# Patient Record
Sex: MALE | NOT HISPANIC OR LATINO | Employment: OTHER | URBAN - METROPOLITAN AREA
[De-identification: names, ages, dates, MRNs, and addresses within clinical notes are randomized per-mention and may not be internally consistent; named-entity substitution may affect disease eponyms.]

---

## 2020-12-31 ENCOUNTER — HOSPITAL ENCOUNTER (OUTPATIENT)
Dept: URGENT CARE | Facility: CLINIC | Age: 35
Discharge: HOME | End: 2020-12-31

## 2021-01-03 LAB — SARS-COV-2 RNA SPEC QL NAA+PROBE: DETECTED

## 2021-04-27 ENCOUNTER — OFFICE VISIT CONVERTED (OUTPATIENT)
Dept: FAMILY MEDICINE CLINIC | Facility: CLINIC | Age: 36
End: 2021-04-27
Attending: NURSE PRACTITIONER

## 2021-04-27 ENCOUNTER — HOSPITAL ENCOUNTER (OUTPATIENT)
Dept: FAMILY MEDICINE CLINIC | Facility: CLINIC | Age: 36
Discharge: HOME | End: 2021-04-27
Attending: NURSE PRACTITIONER

## 2021-04-27 ENCOUNTER — CONVERSION ENCOUNTER (OUTPATIENT)
Dept: FAMILY MEDICINE CLINIC | Facility: CLINIC | Age: 36
End: 2021-04-27

## 2021-04-27 LAB
ALBUMIN SERPL-MCNC: 4.5 G/DL (ref 3.5–5)
ALBUMIN/GLOB SERPL: 1.8 {RATIO} (ref 1.4–2.6)
ALP SERPL-CCNC: 49 U/L (ref 53–128)
ALT SERPL-CCNC: 14 U/L (ref 10–40)
AMPHET UR QL CFM: NEGATIVE
ANION GAP SERPL CALC-SCNC: 15 MMOL/L (ref 8–19)
APPEARANCE UR: ABNORMAL
AST SERPL-CCNC: 23 U/L (ref 15–50)
BARBITURATES UR QL: NEGATIVE
BASOPHILS # BLD AUTO: 0.02 10*3/UL (ref 0–0.2)
BASOPHILS NFR BLD AUTO: 0.5 % (ref 0–3)
BENZODIAZ UR QL SCN: NEGATIVE
BILIRUB SERPL-MCNC: 0.44 MG/DL (ref 0.2–1.3)
BILIRUB UR QL STRIP: NEGATIVE
BILIRUB UR QL: NEGATIVE
BUN SERPL-MCNC: 10 MG/DL (ref 5–25)
BUN/CREAT SERPL: 11 {RATIO} (ref 6–20)
CALCIUM SERPL-MCNC: 9.5 MG/DL (ref 8.7–10.4)
CHLORIDE SERPL-SCNC: 101 MMOL/L (ref 99–111)
COLOR UR: YELLOW
COLOR UR: YELLOW
CONV ABS IMM GRAN: 0.01 10*3/UL (ref 0–0.2)
CONV AMP/METHAMP UR: NEGATIVE
CONV BACTERIA: NEGATIVE
CONV CLARITY OF URINE: CLEAR
CONV CO2: 28 MMOL/L (ref 22–32)
CONV COCAINE, UR: NEGATIVE
CONV COLLECTION SOURCE (UA): ABNORMAL
CONV HIV-1/ HIV-2: NONREACTIVE
CONV IMMATURE GRAN: 0.3 % (ref 0–1.8)
CONV PROTEIN IN URINE BY AUTOMATED TEST STRIP: NEGATIVE
CONV TOTAL PROTEIN: 7 G/DL (ref 6.3–8.2)
CONV UROBILINOGEN IN URINE BY AUTOMATED TEST STRIP: 0.2 {EHRLICHU}/DL (ref 0.1–1)
CONV UROBILINOGEN IN URINE BY AUTOMATED TEST STRIP: NORMAL
CREAT UR-MCNC: 0.91 MG/DL (ref 0.7–1.2)
DEPRECATED RDW RBC AUTO: 41.5 FL (ref 35.1–43.9)
EOSINOPHIL # BLD AUTO: 0.05 10*3/UL (ref 0–0.7)
EOSINOPHIL # BLD AUTO: 1.3 % (ref 0–7)
ERYTHROCYTE [DISTWIDTH] IN BLOOD BY AUTOMATED COUNT: 12.6 % (ref 11.6–14.4)
GFR SERPLBLD BASED ON 1.73 SQ M-ARVRAT: >60 ML/MIN/{1.73_M2}
GLOBULIN UR ELPH-MCNC: 2.5 G/DL (ref 2–3.5)
GLUCOSE SERPL-MCNC: 85 MG/DL (ref 70–99)
GLUCOSE UR QL: NEGATIVE
GLUCOSE UR QL: NEGATIVE MG/DL
HCT VFR BLD AUTO: 42.6 % (ref 42–52)
HGB BLD-MCNC: 14.2 G/DL (ref 14–18)
HGB UR QL STRIP: NEGATIVE
HGB UR QL STRIP: NEGATIVE
KETONES UR QL STRIP: NEGATIVE
KETONES UR QL STRIP: NEGATIVE MG/DL
LEUKOCYTE ESTERASE UR QL STRIP: NEGATIVE
LEUKOCYTE ESTERASE UR QL STRIP: NEGATIVE
LYMPHOCYTES # BLD AUTO: 1.42 10*3/UL (ref 1–5)
LYMPHOCYTES NFR BLD AUTO: 36.7 % (ref 20–45)
MCH RBC QN AUTO: 30 PG (ref 27–31)
MCHC RBC AUTO-ENTMCNC: 33.3 G/DL (ref 33–37)
MCV RBC AUTO: 89.9 FL (ref 80–96)
MDMA UR QL SCN: NEGATIVE
METHADONE UR QL SCN: NEGATIVE
MONOCYTES # BLD AUTO: 0.38 10*3/UL (ref 0.2–1.2)
MONOCYTES NFR BLD AUTO: 9.8 % (ref 3–10)
NEUTROPHILS # BLD AUTO: 1.99 10*3/UL (ref 2–8)
NEUTROPHILS NFR BLD AUTO: 51.4 % (ref 30–85)
NITRITE UR QL STRIP: NEGATIVE
NITRITE UR QL STRIP: NEGATIVE
NRBC CBCN: 0 % (ref 0–0.7)
OPIATES UR QL SCN: NEGATIVE
OSMOLALITY SERPL CALC.SUM OF ELEC: 288 MOSM/KG (ref 273–304)
OXYCODONE UR QL SCN: NEGATIVE
PCP UR QL: NEGATIVE
PH UR STRIP.AUTO: 8.5 [PH]
PH UR STRIP.AUTO: 8.5 [PH] (ref 5–8)
PLATELET # BLD AUTO: 207 10*3/UL (ref 130–400)
PMV BLD AUTO: 9.2 FL (ref 9.4–12.4)
POTASSIUM SERPL-SCNC: 4.1 MMOL/L (ref 3.5–5.3)
PROT UR QL: NEGATIVE MG/DL
RBC # BLD AUTO: 4.74 10*6/UL (ref 4.7–6.1)
RBC #/AREA URNS HPF: ABNORMAL /[HPF]
SODIUM SERPL-SCNC: 140 MMOL/L (ref 135–147)
SP GR UR: 1.01 (ref 1–1.03)
SP GR UR: 1.02
T4 FREE SERPL-MCNC: 1.1 NG/DL (ref 0.9–1.8)
THC SERPLBLD CFM-MCNC: NEGATIVE NG/ML
TSH SERPL-ACNC: 1.26 M[IU]/L (ref 0.27–4.2)
WBC # BLD AUTO: 3.87 10*3/UL (ref 4.8–10.8)
WBC #/AREA URNS HPF: ABNORMAL /[HPF]

## 2021-04-28 LAB
25(OH)D3 SERPL-MCNC: 39.7 NG/ML (ref 30–100)
FOLATE SERPL-MCNC: >20 NG/ML (ref 4.8–20)
HSV I/II IGM: <0.91 RATIO (ref 0–0.9)
HSV1 IGG SER IA-ACNC: 49.2 INDEX (ref 0–0.9)
HSV2 IGG SER IA-ACNC: <0.91 INDEX (ref 0–0.9)
RPR SER QL: NORMAL
VIT B12 SERPL-MCNC: 679 PG/ML (ref 211–911)

## 2021-04-29 LAB
C TRACH RRNA CVX QL NAA+PROBE: NEGATIVE
CONV HEPATITIS B SURFACE AG W CONFIRMATION RE: NEGATIVE
CONV HEPATITIS COMMENT: NORMAL
HBV CORE AB SER DONR QL IA: NEGATIVE
HBV CORE IGM SERPL QL IA: NEGATIVE
HBV E AB SERPL QL IA: NEGATIVE
HBV E AG SERPL QL IA: NEGATIVE
HBV SURFACE AB SER QL: NON REACTIVE
HCV AB S/CO SERPL IA: <0.1 S/CO RATIO (ref 0–0.9)
N GONORRHOEA DNA SPEC QL NAA+PROBE: NEGATIVE

## 2021-05-11 ENCOUNTER — HOSPITAL ENCOUNTER (OUTPATIENT)
Dept: FAMILY MEDICINE CLINIC | Facility: CLINIC | Age: 36
Discharge: HOME | End: 2021-05-11
Attending: NURSE PRACTITIONER

## 2021-05-11 ENCOUNTER — OFFICE VISIT CONVERTED (OUTPATIENT)
Dept: FAMILY MEDICINE CLINIC | Facility: CLINIC | Age: 36
End: 2021-05-11
Attending: NURSE PRACTITIONER

## 2021-05-11 LAB
IRON SATN MFR SERPL: 18 % (ref 20–55)
IRON SERPL-MCNC: 65 UG/DL (ref 70–180)
MAGNESIUM SERPL-MCNC: 1.99 MG/DL (ref 1.6–2.3)
TIBC SERPL-MCNC: 355 UG/DL (ref 245–450)
TRANSFERRIN SERPL-MCNC: 248 MG/DL (ref 215–365)

## 2021-05-11 NOTE — H&P
"   History and Physical      Patient Name: Dominik Montiel   Patient ID: [id number]   Sex: Male   YOB: 1985        Visit Date: April 27, 2021    Provider: SKYE Castillo   Location: [facility]   Location Address: [id number] [place] [address] [place]  [id number]   Location Phone: [phone]          Chief Complaint  · est. visit  · trouble sleeping  · anxiety, depression  · meds for chemical inbalance  · wants blood work      History Of Present Illness  Dominik Montiel is a 35 year old male who presents for evaluation and treatment of:      est care    anxiety/depression/insomnia- he works for [employer], he stays at work as long as possible so he doesn't have to go home and be with his family. Its a good job for him because he doesn't have to interact wiht people. He is nervous all the time, he is depressed, he doens't feel comfortable around anyone. He has tried mult meds in the past, zoloft, paxil, effexor and nothing helps him except wellbutrin. He was taking that from [facility]. He is emotional, he feels hopeless, he denies SI/HI. He scores a 25 on the PHQ9. This has gone on since he was a teenager.     the insomnia is bad, he has had it since he was a teen. Has taken restoril, tenazepam, ambien in the past wiht no improvment. He lays in bed and can't turn his mind off. \"its the anxiety.\"    weakness, fatigue    hx of STI, would like a screen today, he does have dysuria and increased freq. at times.       Past Medical History  Disease Name Date Onset Notes   Anxiety --  --    Depression --  --    Hemorrhoids --  --    Hernia --  --    Psychiatric problem --  --    Seasonal allergies --  --    Sinus trouble --  --    Skin disorder --  --    STD (male) --  --          Past Surgical History  Procedure Name Date Notes   Colonoscopy --  --          Allergy List  Allergen Name Date Reaction Notes   NO KNOWN DRUG ALLERGIES --  --  --        Allergies Reconciled  Family Medical " "History  Disease Name Relative/Age Notes   *No Known Family History  --          Social History  Finding Status Start/Stop Quantity Notes   Tobacco Never --/-- --  --          Review of Systems  · Constitutional  o Admits  o : fatigue, loss of appetite  o Denies  o : fever  · HENT  o Denies  o : headaches, lightheadedness  · Cardiovascular  o Denies  o : lower extremity edema, claudication, chest pressure, palpitations  · Respiratory  o Denies  o : shortness of breath, wheezing, cough, hemoptysis, dyspnea on exertion  · Gastrointestinal  o Admits  o : constipation  o Denies  o : nausea, vomiting, diarrhea, abdominal pain  · Psychiatric  o Admits  o : anxiety, depression  o Denies  o : suicidal ideation, homicidal ideation      Vitals  Date Time BP Position Site L\R Cuff Size HR RR TEMP (F) WT  HT  BMI kg/m2 BSA m2 O2 Sat FR L/min FiO2        04/27/2021 01:54 /66 Sitting    74 - R 16 98.1 132lbs 0oz 5'  8\" 20.07 1.69 96 %            Physical Examination  · Constitutional  o Appearance  o : well developed, well-nourished, no acute distress  · Neck  o Inspection/Palpation  o : normal appearance, no masses or tenderness, trachea midline  o Thyroid  o : gland size normal, nontender, no nodules or masses present on palpation  · Respiratory  o Respiratory Effort  o : breathing unlabored  o Inspection of Chest  o : chest rise symmetric bilaterally  o Auscultation of Lungs  o : clear to auscultation bilaterally throughout inspiration and expiration  · Cardiovascular  o Heart  o :   § Auscultation of Heart  § : regular rate and rhythm, no murmurs, gallops or rubs  o Peripheral Vascular System  o :   § Extremities  § : no edema  · Lymphatic  o Neck  o : no cervical lymphadenopathy, no supraclavicular lymphadenopathy  · Psychiatric  o Mood and Affect  o : mood and affect anxious          Results  · In-Office Procedures  o Lab procedure  § IOP - Urinalysis without Microscopy (Clinitek) [facility] (65842)   § Color Ur: Yellow "   § Clarity Ur: Clear   § Glucose Ur Ql Strip: Negative   § Bilirub Ur Ql Strip: Negative   § Ketones Ur Ql Strip: Negative   § Sp Gr Ur Qn: 1.020   § Hgb Ur Ql Strip: Negative   § pH Ur-LsCnc: 8.5   § Prot Ur Ql Strip: Negative   § Urobilinogen Ur Strip-mCnc: 0.2 E.U./dL   § Nitrite Ur Ql Strip: Negative   § WBC Est Ur Ql Strip: Negative   § IOP - Urine Drug Screen In-House [facility] (73491)   § Amphetamines Ur Ql: Negative   § Barbiturates Ur Ql: Negative   § Buprenorphine+Nor Ur Ql Scn: Negative   § Benzodiaz Ur Ql: Negative   § Cocaine Ur Ql: Negative   § Methadone Ur Ql: Negative   § Methamphet Ur Ql: Negative   § MDMA Ur Ql Scn: Negative   § Opiates Ur Ql: Negative   § Oxycodone Ur Ql: Negative   § PCP Ur Ql: Negative   § THC Ur Ql: Negative   § Temp in Range?: Within/Acceptable   § Control Seen?: Yes       Assessment  · Screening for depression     V79.0/Z13.89  · Fatigue     780.79/R53.83  · Insomnia, unspecified     780.52/G47.00  · STD exposure     V01.6/Z20.2  · Anxiety and depression       Anxiety disorder, unspecified     300.00/F41.9  Major depressive disorder, single episode, unspecified     300.00/F32.9  · Establishing care with new doctor, encounter for     V65.8/Z76.89      Plan  · Orders  o Annual depression screening, 15 minutes (93224, ) - V79.0/Z13.89 - 04/27/2021  o GC/Chlamydia by PCR (Urine or Vaginal Swab) [facility] ([facility]) - V01.6/Z20.2 - 04/27/2021  o HIV (1 and 2) Screen by EIA (41969) - V01.6/Z20.2 - 04/27/2021  o RPR (Treponemal pallidum Antibody) (95627) - V01.6/Z20.2 - 04/27/2021  o HSV 1 + 2 ab (22542, 57357) - V01.6/Z20.2 - 04/27/2021  o Hepatitis B and C screen (79103) - V01.6/Z20.2 - 04/27/2021  o CBC with Auto Diff [facility] (97824) - - 04/27/2021  o CMP [facility] (93546) - - 04/27/2021  o Thyroid Profile (40932, THYII, 40234) - - 04/27/2021  o Vitamin D (25-Hydroxy) Level (42417) - - 04/27/2021  o ACO-18: Positive screen for clinical depression using a standardized tool and a follow-up plan  documented () - - 04/27/2021  o ACO-39: Current medications updated and reviewed (, 1159F) - - 04/27/2021  o B12 Folate levels (B12FO) - - 04/27/2021  o Urinalysis with Reflex Microscopy if abnormal ([facility]) (41596) - - 04/27/2021  · Medications  o bupropion HCl 150 mg oral tablet extended release 24 hr   SIG: take 1 tablet (150 mg) by oral route once daily   DISP: (30) Tablet with 0 refills  Prescribed on 04/27/2021     o Lunesta 1 mg oral tablet   SIG: take 1-2 tablets by oral route daily   DISP: (30) Tablet with 0 refills  Adjusted on 04/27/2021     o Medications have been Reconciled  o Transition of Care or Provider Policy  · Instructions  o Depression Screen completed and scanned into the EMR under the designated folder within the patient's documents.  o Today's PHQ-9 result is _25__  o The provider screening met the required time of 15 minutes.  o Avoid any electronic use for at least 30 minutes prior to bed time. Cell phone screens, tablets and TVs imitate daylight, so your brain can become confused on the time of day. No caffeine use in the late afternoon and evenings.  o Obtained a written consent for MALIK query. Discussed the risk and benefits of the use of controlled substances with the patient, including the risk of tolerance and drug dependence. The patient has been counseled on the need to have an exit strategy, including potentially discontinuing the use of controlled substances. MALIK has or will be reviewed as soon as it becomes avaliable.  o Take all medications as prescribed/directed.  o Patient was educated/instructed on their diagnosis, treatment and medications prior to discharge from the clinic today.  o Patient was instructed to exercise regularly.  o Patient instructed to seek medical attention urgently for new or worsening symptoms.  o Call the office with any concerns or questions.  o Minutes spent with patient including greater than 50% in Education/Counseling/Care  Coordination.  o Time spent with the patient was minutes, more than 50% face to face.  o Chronic conditions reviewed and taken into consideration for today's treatment plan.  o will try hiim on lunesta with precautions  o will do wellbutrin wiht f/u in 2 weeks sooner if needed  o he says he used to go to [employer], he would like to get back into that again  o encourage exercise and outdoors  o check labs and call with results  · Disposition  o Call or Return if symptoms worsen or persist.  o F/U with me in 2 weeks            Electronically Signed by: SKYE Castillo -Author on April 27, 2021 03:25:32 PM

## 2021-05-14 VITALS
RESPIRATION RATE: 16 BRPM | HEIGHT: 68 IN | DIASTOLIC BLOOD PRESSURE: 66 MMHG | BODY MASS INDEX: 20 KG/M2 | OXYGEN SATURATION: 96 % | SYSTOLIC BLOOD PRESSURE: 114 MMHG | HEART RATE: 74 BPM | TEMPERATURE: 98.1 F | WEIGHT: 132 LBS

## 2021-05-25 ENCOUNTER — OFFICE VISIT CONVERTED (OUTPATIENT)
Dept: PSYCHIATRY | Facility: CLINIC | Age: 36
End: 2021-05-25
Attending: STUDENT IN AN ORGANIZED HEALTH CARE EDUCATION/TRAINING PROGRAM

## 2021-06-05 NOTE — PROGRESS NOTES
"   Progress Note      Patient Name: Dominik Montiel   Patient ID: [id number]   Sex: Male   YOB: 1985    Referring Provider: Efrain Carnes MD    Visit Date: May 11, 2021    Provider: SKYE Castillo   Location: [facility]   Location Address: [address]  [place]  [id number]   Location Phone: [phone]          Chief Complaint  · insomnia  · anxiety      History Of Present Illness  Dominik Montiel is a 35 year old /White male who presents for evaluation and treatment of:      f.u on anxiety, insomnia    the wellbutrin has made him more \"agitated.\" He says he just needs more time on it, it's done this before. He is not sleeping, the lunesta doesn't work, he is tired of doctors just throwing more and more pills at him, he wants more labs checked, he thinks magnesium needs to be checked, he thinks he may need a brain scan to check his hormones. Everything makes him on edge, fluorescent lights \"mess me up.\"    He has tried and failed for sleep ambien, melatonin, trazodone, temazepam, and lunesta. he wants a sleep evaluation.     for the anxiety/depression, he has been on paxil, Zoloft, Effexor, Lexapro, Prozac in the past. He says \"they just messed me up and nothing works.\"     He does not want to see psych.       Past Medical History  Disease Name Date Onset Notes   ADHD (attention deficit hyperactivity disorder) 03/10/2015 --    Allergic rhinitis --  --    Anxiety --  --    Bipolar disorder --  --    Deafness --  --    Depression --  --    Hemorrhoids --  --    Insomnia 02/09/2015 --    Memory loss/Forgetfulness --  --    Night sweats --  --    Sinus Trouble; unspecified --  --          Past Surgical History  Procedure Name Date Notes   [place] Tooth Extraction --  --          Medication List  Name Date Started Instructions   bupropion HCl 150 mg oral tablet extended release 24 hr  take 1 tablet (150 mg) by oral route once daily         Allergy " "List  Allergen Name Date Reaction Notes   NO KNOWN DRUG ALLERGIES --  --  --        Allergies Reconciled  Family Medical History  Disease Name Relative/Age Notes   - No Family History of Colorectal Cancer  --    *Non Contributory  --          Social History  Finding Status Start/Stop Quantity Notes   Alcohol Former --/-- --  --    Moderate Amount of Exercise (1-3 times weekly) --  --/-- --  --    Tobacco Former --/-- --  --          Review of Systems  · Constitutional  o Admits  o : insomnia  o Denies  o : fever, fatigue, weight loss, weight gain  · Cardiovascular  o Denies  o : lower extremity edema, claudication, chest pressure, palpitations  · Respiratory  o Denies  o : shortness of breath, wheezing, cough, hemoptysis, dyspnea on exertion  · Gastrointestinal  o Denies  o : nausea, vomiting, diarrhea, constipation, abdominal pain  · Neurologic  o Admits  o : difficulty concentrating  · Psychiatric  o Admits  o : anxiety, depression      Vitals  Date Time BP Position Site L\R Cuff Size HR RR TEMP (F) WT  HT  BMI kg/m2 BSA m2 O2 Sat FR L/min FiO2        05/11/2021 03:22 /64 Sitting    82 - R 16 98.2 130lbs 0oz 5'  8\" 19.77 1.68 100 %            Physical Examination  · Constitutional  o Appearance  o : well developed, well-nourished, no acute distress  · Respiratory  o Respiratory Effort  o : breathing unlabored  o Inspection of Chest  o : chest rise symmetric bilaterally  o Auscultation of Lungs  o : clear to auscultation bilaterally throughout inspiration and expiration  · Cardiovascular  o Heart  o :   § Auscultation of Heart  § : regular rate and rhythm, no murmurs, gallops or rubs  o Peripheral Vascular System  o :   § Extremities  § : no edema  · Lymphatic  o Neck  o : no cervical lymphadenopathy, no supraclavicular lymphadenopathy  · Psychiatric  o Judgement and Insight  o : judgement and insight intact  o Thought Processes  o : rapidity of thinking present   o Mood and Affect  o : mood agitated, " "affect anxious  o Presence of Abnormal Thoughts  o : no homicidal ideation, no suicidal ideation, mild obsessional thought processes present              Assessment  · Anxiety disorder     300.00/F41.9  · Fatigue     780.79/R53.83  · Insomnia, unspecified     780.52/G47.00  · Major depress dis, severe     296.23/F32.2  · Agitation     307.9/R45.1      Plan  · Orders  o ACO-39: Current medications updated and reviewed (1159F, ) - - 05/11/2021  o Psychiatric Consultation (PSYCH) - - 05/11/2021   prefer dr Watt only, I consulted with him on this pt.today  o Magnesium, serum (82776) - - 05/11/2021  o Iron Profile (Iron 11370 TIBC 29292 and Transferrin 14299) (IRONP) - - 05/11/2021  o Sleep Disorder Clinic Consultation (SLEEP) - - 05/11/2021  [facility] - - 05/11/2021  · Medications  o Abilify 2 mg oral tablet   SIG: take 1 tablet (2 mg) by oral route once daily   DISP: (30) Tablet with 0 refills  Prescribed on 05/11/2021     o Lunesta 1 mg oral tablet   SIG: take 1-2 tablet by oral route QD   DISP: (0) Tablet with 0 refills  Discontinued on 05/11/2021     o Medications have been Reconciled  o Transition of Care or Provider Policy  · Instructions  o Discussed the need for therapy, either with a certified counselor, psychologist, and/or family . If no improvement is noted or worsening of their condition, return to office or ER. But also discussed with patient that if they are non-responsive to the type of medication they may need to see a psychiatrist for further evaluation and management.  o Patient was given an SSRI/SSNRI medication and warned of possible side effects of the medication including potential for increased risk of suicidal thoughts and feelings. Patient was instructed that if they begin to exhibit any of these effects they will discontinue the medication immediately and contact our office or the ER ASAP.  o Patient agrees to a \"No Self Harm\" contract. Patient will " either call us, 911, ER, [facility], [facility].  o Avoid any electronic use for at least 30 minutes prior to bed time. Cell phone screens, tablets and TVs imitate daylight, so your brain can become confused on the time of day. No caffeine use in the late afternoon and evenings.  o Patient was educated/instructed on their diagnosis, treatment and medications prior to discharge from the clinic today.  o Patient instructed to seek medical attention urgently for new or worsening symptoms.  o Call the office with any concerns or questions.  o Minutes spent with patient including greater than 50% in Education/Counseling/Care Coordination.  o Time spent with the patient was minutes, more than 50% face to face.  o I consulted with Dr Watt on this pt, because I feel like the wellbutrin has made him on the manic side, pt is willing to take abilify low dose with the wellbutrin, he does not want to come off the wellbutrin. he will call me in a week to touch base and let me know how he is doing, will do genesight testing and call wht results as well as other labs. He is willing to see Psych and we will also do the sleep center consultation. He does nt want to try anything else for sleep at this time.   · Disposition  o Call or Return if symptoms worsen or persist.            Electronically Signed by: SKYE Castillo -Author on May 14, 2021 05:19:41 PM

## 2021-06-05 NOTE — H&P
"   History and Physical      Patient Name: Dominik Montiel   Patient ID: [id number]   Sex: Male   YOB: 1985    Referring Provider: Sandra CHEUNG    Visit Date: May 25, 2021    Provider: Natalia Watt MD   Location: [facility]   Location Address: [address] [place] [address]  [address] [facility]  [place]  [id number]   Location Phone: [phone]          Chief Complaint     \"I've been anxious for ten years.\"       History Of Present Illness  Dominik Montiel is a 35 year old /White male who presents to the office today referred by Sandra CHEUNG.   Chart review 5/25: Patient seen May 11 for follow-up for anxiety and insomnia. He tried Wellbutrin but it made him more agitated. He has failed Ambien, melatonin, trazodone, temazepam, Lunesta. He would like a sleep evaluation. He has been trialed on Paxil, Zoloft, Effexor, Lexapro, Prozac in the past and nothing works. Started on Abilify. MRI in 2014 shows a 4 millimeter high flow enhancing focus in the right aspect of the pituitary gland consistent with a microadenoma, no acute. Magnesium is normal, iron is low at 65% sat is low at 18% CMP is unremarkable, vitamin D is normal, B12 and folate are normal, UA is unremarkable, HIV is negative, GC chlamydia is negative, hepatitis panel is negative, positive for herpes type I IgG, negative for syphilis, CBC is unremarkable, TSH and free T4 are unremarkable.   Patient is distractible, flight of ideas is present, sleeping about 2 hours a night, extremely talkative and difficult to interrupt. Notes anxiety for the last 10 years. Also notes social anxiety. States that he \"cannot think\" because his mind is racing all the time.   Very difficult to obtain information from the patient.   No SI HI AVH. No access to weapons. Psychiatric review of systems is positive for urmila, anxiety, depression, negative for psychosis.   ...   Past Psychiatric History:  Began Psychiatric Treatment: 9 yrs of " age for adhd   Dx: adhd, a, d, insomnia   Psychiatrist: Young last year, cannot remember name   Therapist: young, one visit, didn't like   : denies   Admissions History: denies   Medication Trials: Multiple. See hpi. Adderall, concerta didn't help. wellbutrin used to work but it is making him more anxious   Self-Harm: denies   Suicide Attempts: denies   Substance Abuse History:  Types: tried Xanax and Seroquel once last year to sleep. Denies all else, including illicit.   Withdrawl Symptoms: na   Longest period sober: na   AA: N/A   Admissions History: na   Residential History: na   Legal: N/A   Social History:  Marital Status: Single   Employed: Yes   Employer: [employer]   Kids: denies   House: lives with his father    Hx: denies   Family History:  Suicide Attempts: deneis   Suicide Completions: deneis   Substance Use: denies   Psychiatric Conditions: mom: d, maternal cousin: adhd    depression, psychosis, anxiety: na   Developmental History:  Born: [place]   Siblings: brother   Childhood: no abuse   High School: completed   College: completed, business mx degree       Past Medical History  Disease Name Date Onset Notes   ADHD (attention deficit hyperactivity disorder) 03/10/2015 --    Allergic rhinitis --  --    Anxiety --  --    Bipolar disorder --  --    Deafness --  --    Depression --  --    Hemorrhoids --  --    Insomnia 2015 --    Memory loss/Forgetfulness --  --    Night sweats --  --    Sinus Trouble; unspecified --  --          Past Surgical History  Procedure Name Date Notes   [place]a Tooth Extraction --  --          Medication List  Name Date Started Instructions   bupropion HCl 150 mg oral tablet extended release 24 hr  take 1 tablet (150 mg) by oral route once daily         Allergy List  Allergen Name Date Reaction Notes   NO KNOWN DRUG ALLERGIES --  --  --          Family Medical History  Disease Name Relative/Age Notes   - No Family History of Colorectal Cancer  --   "  Family history of depression  --          Social History  Finding Status Start/Stop Quantity Notes   Alcohol Former --/-- --  --    Moderate Amount of Exercise (1-3 times weekly) --  --/-- --  --    Tobacco Former --/-- --  --          Review of Systems  · Constitutional  o Admits  o : fatigue  o Denies  o : night sweats  · Eyes  o Admits  o : double vision  o Denies  o : blurred vision  · HENT  o Denies  o : vertigo, recent head injury  · Cardiovascular  o Denies  o : chest pain, irregular heart beats  · Respiratory  o Denies  o : shortness of breath, productive cough  · Gastrointestinal  o Denies  o : nausea, vomiting  · Genitourinary  o Denies  o : dysuria, urinary retention  · Integument  o Denies  o : hair growth change, new skin lesions  · Neurologic  o Denies  o : altered mental status, seizures  · Musculoskeletal  o Denies  o : joint swelling, limitation of motion  · Endocrine  o Denies  o : cold intolerance, heat intolerance  · Psychiatric  o Admits  o : anxiety, depression, obsessive compulsive disorder  o Denies  o : post traumatic stress disorder, psychosis, urmila  · Allergic-Immunologic  o Denies  o : frequent illnesses      Physical Examination  · Mental Status Exam  o Appearance  o : good eye contact, normal street clothes, groomed, mask, beard  o Behavior  o : pleasant and cooperative  o Motor  o : some fidgeting  o Speech  o : hard to interrupt. talkative. Rapid rhythm, rate, volume, tone; hyperverbal, not pressured, normal prosidy  o Mood  o : \"up\"  o Affect  o : irritable, hypomanic  o Thought Content  o : negative suicidal ideations, negative homicidal ideations, negative obsessions  o Perceptions  o : negative auditory hallucinations, negative visual hallucinations  o Thought Process  o : flight of ideas  o Insight/Judgement  o : fair/fair  o Cognition  o : grossly intact  o Attention  o : distractible          Assessment  · Screening for depression     V79.0/Z13.31  · Bipolar 1 disorder with " moderate urmila     296.42/F31.12       Presentation most consistent with bipolar 1 disorder, currently hypomanic.  Anxiety, depression, ADHD by history.    Start low-dose Seroquel in the evenings as the patient is kasia to it.  Therapy is deferred.  Discontinue bupropion.    See back in 1 month.       Plan  · Orders  o ACO-18: Positive screen for clinical depression using a standardized tool and a follow-up plan documented () - V79.0/Z13.31 - 05/25/2021  o ACO-39: Current medications updated and reviewed (, 1159F) - - 05/25/2021  · Medications  o quetiapine 50 mg oral tablet   SIG: take 1 tablet by oral route once a day (at bedtime) for 30 days   DISP: (30) Tablet with 3 refills  Prescribed on 05/25/2021     o bupropion HCl 150 mg oral tablet extended release 24 hr   SIG: take 1 tablet (150 mg) by oral route once daily   DISP: (0) Tablet with 0 refills  Discontinued on 05/25/2021     o Medications have been Reconciled  o Transition of Care or Provider Policy  · Instructions  o Risk Assessment: Risk of self-harm acutely is moderate. Risk factors include anxiety disorder, depressive disorder, hypomania, recent psychosocial stressors (pandemic). Protective factors include no family history, no present SI, no history of suicide attempts or self-harm in the past, no access to weapons, minimal AODA, healthcare seeking, future orientation, willingness to engage in care. Risk of self-harm chronically is also moderate, and could be further elevated in the event of treatment noncompliance and/or AODA.  o Safety: no acute safety concerns.  o Medications: START quetiapine 50 mg PO QHS. Risks, benefits, alternatives discussed with patient including nausea and vomiting, GI upset, sedation, akathisia, hypotension, increased appetite, lowering of seizure threshold, theoretical risk of tardive dyskinesia, extrapyramidal symptoms, restless legs syndrome. After discussion of these risks and benefits, the patient voiced  understanding and agreed to proceed. DC bupropion.  o Therapy: def  o Follow Up: 1 mo  · Disposition  o Follow Up in 1 month.  · Correspondence  o Behavioral Health Letter to Referring MD (Sandra CHEUNG) - 05/25/2021            Electronically Signed by: Natalia Watt MD -Author on May 25, 2021 02:35:37 PM

## 2021-07-15 VITALS
SYSTOLIC BLOOD PRESSURE: 108 MMHG | HEIGHT: 68 IN | TEMPERATURE: 98.2 F | RESPIRATION RATE: 16 BRPM | DIASTOLIC BLOOD PRESSURE: 64 MMHG | OXYGEN SATURATION: 100 % | HEART RATE: 82 BPM | WEIGHT: 130 LBS | BODY MASS INDEX: 19.7 KG/M2

## 2021-07-15 VITALS
HEART RATE: 74 BPM | WEIGHT: 130 LBS | SYSTOLIC BLOOD PRESSURE: 112 MMHG | TEMPERATURE: 98.9 F | OXYGEN SATURATION: 96 % | BODY MASS INDEX: 20.4 KG/M2 | DIASTOLIC BLOOD PRESSURE: 67 MMHG | RESPIRATION RATE: 18 BRPM | HEIGHT: 67 IN

## 2021-10-04 ENCOUNTER — TELEPHONE (OUTPATIENT)
Dept: FAMILY MEDICINE CLINIC | Facility: CLINIC | Age: 36
End: 2021-10-04

## 2021-10-04 DIAGNOSIS — R53.83 FATIGUE, UNSPECIFIED TYPE: ICD-10-CM

## 2021-10-04 DIAGNOSIS — F51.01 PRIMARY INSOMNIA: Primary | ICD-10-CM

## 2021-10-04 NOTE — TELEPHONE ENCOUNTER
Caller: Dominik Montiel    Relationship: Self    Best call back number: [phone]    What was the call regarding: PATIENT IS CALLING IN BECAUSE HE HAD TO MISS HIS APPOINTMENT DUE TO THE FACT THAT HE MOVED.     HE IS CALLING BACK IN TO GET ANOTHER SLEEP STUDY DONE ASAP. HIS INSOMNIA IS MAKING HIM MISERABLE.     Do you require a callback: YES, ASAP, TO RESCHEDULE APPOINTMENT

## 2021-10-05 NOTE — TELEPHONE ENCOUNTER
Sandra ordered this on 5/13/21 in [place] and it was scheduled for 8/18/21, so a new order would have to be put in [facility] to get scheduled.

## 2021-10-18 ENCOUNTER — OFFICE VISIT (OUTPATIENT)
Dept: FAMILY MEDICINE CLINIC | Facility: CLINIC | Age: 36
End: 2021-10-18

## 2021-10-18 VITALS
BODY MASS INDEX: 20.76 KG/M2 | OXYGEN SATURATION: 99 % | WEIGHT: 132.3 LBS | RESPIRATION RATE: 20 BRPM | DIASTOLIC BLOOD PRESSURE: 69 MMHG | SYSTOLIC BLOOD PRESSURE: 110 MMHG | HEART RATE: 71 BPM | TEMPERATURE: 97.7 F | HEIGHT: 67 IN

## 2021-10-18 DIAGNOSIS — F90.9 HYPERACTIVITY: ICD-10-CM

## 2021-10-18 DIAGNOSIS — E61.1 IRON DEFICIENCY: ICD-10-CM

## 2021-10-18 DIAGNOSIS — Z13.220 SCREENING FOR CHOLESTEROL LEVEL: ICD-10-CM

## 2021-10-18 DIAGNOSIS — G47.00 INSOMNIA, UNSPECIFIED TYPE: ICD-10-CM

## 2021-10-18 DIAGNOSIS — F31.12 BIPOLAR AFFECTIVE DISORDER, CURRENTLY MANIC, MODERATE (HCC): Primary | ICD-10-CM

## 2021-10-18 DIAGNOSIS — F43.9 STRESS AT HOME: ICD-10-CM

## 2021-10-18 DIAGNOSIS — F90.0 ATTENTION DEFICIT HYPERACTIVITY DISORDER (ADHD), PREDOMINANTLY INATTENTIVE TYPE: ICD-10-CM

## 2021-10-18 PROBLEM — F31.9 BIPOLAR DISORDER: Status: ACTIVE | Noted: 2021-10-18

## 2021-10-18 LAB
BASOPHILS # BLD AUTO: 0.02 10*3/MM3 (ref 0–0.2)
BASOPHILS NFR BLD AUTO: 0.4 % (ref 0–1.5)
DEPRECATED RDW RBC AUTO: 41.6 FL (ref 37–54)
EOSINOPHIL # BLD AUTO: 0.06 10*3/MM3 (ref 0–0.4)
EOSINOPHIL NFR BLD AUTO: 1.1 % (ref 0.3–6.2)
ERYTHROCYTE [DISTWIDTH] IN BLOOD BY AUTOMATED COUNT: 12.5 % (ref 12.3–15.4)
FOLATE SERPL-MCNC: >20 NG/ML (ref 4.78–24.2)
HCT VFR BLD AUTO: 42.1 % (ref 37.5–51)
HGB BLD-MCNC: 14.1 G/DL (ref 13–17.7)
IMM GRANULOCYTES # BLD AUTO: 0.01 10*3/MM3 (ref 0–0.05)
IMM GRANULOCYTES NFR BLD AUTO: 0.2 % (ref 0–0.5)
LYMPHOCYTES # BLD AUTO: 2.28 10*3/MM3 (ref 0.7–3.1)
LYMPHOCYTES NFR BLD AUTO: 43.4 % (ref 19.6–45.3)
MCH RBC QN AUTO: 30 PG (ref 26.6–33)
MCHC RBC AUTO-ENTMCNC: 33.5 G/DL (ref 31.5–35.7)
MCV RBC AUTO: 89.6 FL (ref 79–97)
MONOCYTES # BLD AUTO: 0.43 10*3/MM3 (ref 0.1–0.9)
MONOCYTES NFR BLD AUTO: 8.2 % (ref 5–12)
NEUTROPHILS NFR BLD AUTO: 2.45 10*3/MM3 (ref 1.7–7)
NEUTROPHILS NFR BLD AUTO: 46.7 % (ref 42.7–76)
NRBC BLD AUTO-RTO: 0 /100 WBC (ref 0–0.2)
PLATELET # BLD AUTO: 181 10*3/MM3 (ref 140–450)
PMV BLD AUTO: 9.1 FL (ref 6–12)
RBC # BLD AUTO: 4.7 10*6/MM3 (ref 4.14–5.8)
VIT B12 BLD-MCNC: 643 PG/ML (ref 211–946)
WBC # BLD AUTO: 5.25 10*3/MM3 (ref 3.4–10.8)

## 2021-10-18 PROCEDURE — 84443 ASSAY THYROID STIM HORMONE: CPT | Performed by: NURSE PRACTITIONER

## 2021-10-18 PROCEDURE — 84466 ASSAY OF TRANSFERRIN: CPT | Performed by: NURSE PRACTITIONER

## 2021-10-18 PROCEDURE — 83540 ASSAY OF IRON: CPT | Performed by: NURSE PRACTITIONER

## 2021-10-18 PROCEDURE — 99213 OFFICE O/P EST LOW 20 MIN: CPT | Performed by: NURSE PRACTITIONER

## 2021-10-18 PROCEDURE — 82746 ASSAY OF FOLIC ACID SERUM: CPT | Performed by: NURSE PRACTITIONER

## 2021-10-18 PROCEDURE — 82306 VITAMIN D 25 HYDROXY: CPT | Performed by: NURSE PRACTITIONER

## 2021-10-18 PROCEDURE — 80061 LIPID PANEL: CPT | Performed by: NURSE PRACTITIONER

## 2021-10-18 PROCEDURE — 82607 VITAMIN B-12: CPT | Performed by: NURSE PRACTITIONER

## 2021-10-18 PROCEDURE — 85025 COMPLETE CBC W/AUTO DIFF WBC: CPT | Performed by: NURSE PRACTITIONER

## 2021-10-18 PROCEDURE — 80053 COMPREHEN METABOLIC PANEL: CPT | Performed by: NURSE PRACTITIONER

## 2021-10-18 NOTE — PROGRESS NOTES
"Chief Complaint  Anxiety, Insomnia, and Depression    Subjective      History of Present Illness  Dominik Montiel presents to [facility]     Insomnia, he has an appt for a sleep consult. Has been going on for 10 years. He cant tell me how long he sleeps at night but says he has a disorder.      \"I feel like things are going in the right direction.\"    Anxiety and depression, worse since he moved home with his dad and step mom. He doesn't see his mom. He says he just doesn't want to be around anyone. He has an appt to see a different psychiatrist this week, Dr Viera. Would like a copy of [facility] results. He did not take the seroquel or the latuda from psych, he says he \"didn't like having a diagnosis just slapped on me.\"    He wants to go over his labs from last visit and repeat them today.  He wants to know if there is a test to check for appetite.      No past medical history on file.    Allergies  Patient has no known allergies.    No past surgical history on file.    Social History     Tobacco Use   • Smoking status: Never Smoker   • Smokeless tobacco: Never Used   Substance Use Topics   • Alcohol use: Never   • Drug use: Not on file       History reviewed. No pertinent family history.     Health Maintenance Due   Topic Date Due   • ANNUAL PHYSICAL  Never done   • COVID-19 Vaccine (1) Never done   • TDAP/TD VACCINES (2 - Tdap) 05/13/2013   • INFLUENZA VACCINE  Never done        No current outpatient medications on file.      There is no immunization history on file for this patient.    Objective     Vitals:    10/18/21 1546   BP: 110/69   Pulse: 71   Resp: 20   Temp: 97.7 °F (36.5 °C)   SpO2: 99%     Body mass index is 20.72 kg/m².     Review of Systems   Constitutional: Negative.    Respiratory: Negative.    Cardiovascular: Negative.    Psychiatric/Behavioral: Positive for sleep disturbance, positive for hyperactivity, depressed mood and stress. Negative for self-injury and " suicidal ideas. The patient is nervous/anxious.        Physical Exam  Vitals reviewed.   Constitutional:       Appearance: Normal appearance. He is well-developed.   HENT:      Mouth/Throat:      Pharynx: No oropharyngeal exudate.   Cardiovascular:      Rate and Rhythm: Normal rate and regular rhythm.      Heart sounds: Normal heart sounds. No murmur heard.      Pulmonary:      Effort: Pulmonary effort is normal.      Breath sounds: Normal breath sounds.   Neurological:      Mental Status: He is alert and oriented to person, place, and time.      Cranial Nerves: No cranial nerve deficit.      Motor: No weakness.   Psychiatric:         Mood and Affect: Mood is anxious.         Speech: Speech is rapid and pressured.         Thought Content: Thought content does not include homicidal or suicidal ideation. Thought content does not include suicidal plan.           Result Review :    The following data was reviewed by: SKYE Castillo on 10/18/2021:       Depression: At risk   • PHQ-2 Score: 24       Common labs    Common Labsle 4/27/21 4/27/21    1353 1353   Glucose  85   BUN  10   Creatinine  0.91   Sodium  140   Potassium  4.1   Chloride  101   Calcium  9.5   Albumin  4.5   Total Bilirubin  0.44   Alkaline Phosphatase  49 (A)   AST (SGOT)  23   ALT (SGPT)  14   WBC 3.87 (A)    Hemoglobin 14.2    Hematocrit 42.6    Platelets 207    (A) Abnormal value                            Assessment and Plan     Diagnoses and all orders for this visit:    1. Bipolar affective disorder, currently manic, moderate (HCC) (Primary)  Comments:  Follow-up with Dr. Viera for psychiatry, will give a copy of his [facility] report at checkout  Orders:  -     Vitamin D 25 Hydroxy    2. Stress at home    3. Insomnia, unspecified type  Comments:  Follow-up at sleep study clinic for evaluation  Orders:  -     TSH    4. Attention deficit hyperactivity disorder (ADHD), predominantly inattentive type  Comments:  Discussed with  psychiatry  Orders:  -     Comprehensive Metabolic Panel  -     Vitamin D 25 Hydroxy    5. Iron deficiency  Comments:  He is not currently taking his iron we will recheck labs today  Orders:  -     CBC & Differential  -     Iron Profile    6. Hyperactivity  -     Vitamin B12 & Folate    7. Screening for cholesterol level  -     Lipid Panel            Follow Up     Return in about 3 months (around 1/18/2022).    Patient was given instructions and counseling regarding his condition or for health maintenance advice. Please see specific information pulled into the AVS if appropriate.     Dominik Montiel  reports that he has never smoked. He has never used smokeless tobacco.           Sandra Bragg, SKYE

## 2021-10-18 NOTE — PATIENT INSTRUCTIONS
Bipolar 1 Disorder  Bipolar 1 disorder is a mental health disorder in which a person has episodes of emotional highs, or urmila, and may also have episodes of lows, or depression. Bipolar 1 disorder is different from other bipolar disorders in that it involves extreme episodes of urmila (manic episodes). These episodes last at least one week or involve symptoms that are so severe that hospitalization is needed to keep the person safe.  What are the causes?  The cause of this condition is not known.  What increases the risk?  The following factors may make you more likely to develop this condition:  · Having a family member with the disorder.  · Having an imbalance of certain chemicals in the brain (neurotransmitters).  · Experiencing stress, such as illness, financial problems, or a death.  · Having certain conditions that affect the brain or spinal cord (neurologic conditions).  · Having had a brain injury (trauma).  What are the signs or symptoms?  Symptoms of urmila include:  · Very high self-esteem or self-confidence.  · Decreased need for sleep.  · Unusual talkativeness. Speech may be very fast.  · Racing thoughts with quick shifts between topics that may or may not be related (flight of ideas).  · Ability to concentrate either greatly improved or decreased.  · Increased purposeful activity, such as work, studies, or social activity.  · Increased agitation. This could be pacing, squirming, fidgeting, or finger and toe tapping.  · Impulsive behavior and poor judgment. This may result in high-risk activities that are sexual, financial, or physical.  Symptoms of depression include:  · Extreme degrees of sadness, uncontrollable crying, hopelessness, worthlessness, or numbness.  · Sleep problems, such as insomnia, waking early, or sleeping too much.  · No longer enjoying things you used to enjoy.  · Isolation. You may often spend time alone.  · Lack of energy or motivation, and moving more slowly than  normal.  · Trouble making decisions.  · Increased appetite or loss of appetite.  · Thoughts of death, or wanting to harm yourself.  Sometimes, you may have a mixed mood. This means having symptoms of urmila and depression at the same time. Stress can often trigger these symptoms.  How is this diagnosed?  This condition may be diagnosed based on:  · Emotional episodes.  · Medical history.  · Use of alcohol, drugs, and prescription medicines. Certain medical conditions and substances can cause symptoms that seem like bipolar disorder. This is called secondary bipolar disorder.  Your health care provider may ask you to take a short test. This helps to understand your symptoms. You may also be asked to see a mental health specialist to follow up on this diagnosis and start treatment.  How is this treated?         This condition is a long-term (chronic) illness. It is often managed with ongoing treatment rather than treatment only when symptoms occur. A combination of treatments is the main approach. Treatment may include:  · Medicine. Medicine can be prescribed by a health care provider who specializes in treating mental health disorders (psychiatrist). Medicines called mood stabilizers are usually prescribed. If symptoms occur during treatment with a mood stabilizer, other medicines may be added.  · Psychotherapy. Some forms of talk therapy, such as cognitive behavioral therapy (CBT) and family therapy, can help with learning to manage bipolar disorder.  · Psychoeducation. This helps you and others understand how this disorder is managed. Include friends and family in educational sessions so they learn how best to support you.  · Methods of managing your condition, such as journaling or relaxation exercises. Relaxation exercises include:  ? Yoga.  ? Meditation.  ? Deep breathing.  · Lifestyle changes, such as:  ? Limiting alcohol and drug use.  ? Exercising regularly.  ? Structuring when you go to bed and get  up.  ? Eating a healthy diet.  · Electroconvulsive therapy (ECT). This is a procedure in which electricity is applied to the brain through the scalp. It may be used in cases of severe bipolar disorder when medicine and psychotherapy work too slowly or do not work.  Follow these instructions at home:  Activity  · Return to your normal activities as told by your health care provider.  · Find activities that you enjoy, and make time to do them.  · Exercise regularly as told by your health care provider.  Lifestyle    · Follow a set schedule for eating and sleeping.  · Eat a healthy diet that includes fresh fruits and vegetables, whole grains, low-fat dairy, and lean meat.  · Get at least 7-8 hours of sleep each night.  · Avoid using products that contain nicotine or tobacco. If you want help quitting, ask your health care provider.  · Do not use drugs.    Alcohol use  · Do not drink alcohol if:  ? Your health care provider tells you not to drink.  ? You are pregnant, may be pregnant, or are planning to become pregnant.  · If you drink alcohol:  ? Limit how much you use to:  § 0-1 drink a day for women.  § 0-2 drinks a day for men.  ? Be aware of how much alcohol is in your drink. In the [place] one drink equals one 12 oz bottle of beer (355 mL), one 5 oz glass of wine (148 mL), or one 1½ oz glass of hard liquor (44 mL).  General instructions  · Take over-the-counter and prescription medicines only as told by your health care provider. You may think about stopping your medicine, but it is very important to take all your medicine as prescribed.  · Consider joining a support group. Your health care provider may be able to recommend one.  · Talk with your family and friends about your treatment goals and how they can help.  · Keep all follow-up visits as told by your health care provider. This is important.  Where to find more information  · National [place] on Mental Illness: www.thi.org  · National [place] of Mental  Health: www.[facility].gov  Contact a health care provider if:  · Your symptoms get worse, or your loved ones tell you that your symptoms are getting worse.  · You have uncomfortable side effects from your medicine.  · You have trouble sleeping.  · You have trouble doing daily activities.  · You feel unsafe in your surroundings.  · You are self-medicating with alcohol or drugs.  Get help right away if:  · You have new symptoms.  · You have thoughts about harming yourself or others.  · You are considering suicide.  If you ever feel like you may hurt yourself or others, or have thoughts about taking your own life, get help right away. You can go to your nearest emergency department or call:  · Your local emergency services (911 in the [place]  · A suicide crisis helpline, such as the National Suicide Prevention Lifeline at 1-932.276.9329. This is open 24 hours a day.  Summary  · Bipolar 1 disorder is a lifelong mental health disorder in which a person has episodes of urmila and depression.  · This disorder is mainly treated with a combination of medicines, talk and behavioral therapies, and, often, electroconvulsive therapy (ECT).  · Include friends and family in educational sessions so they know how best to support you.  · Get help right away if you are considering suicide.  This information is not intended to replace advice given to you by your health care provider. Make sure you discuss any questions you have with your health care provider.  Document Revised: 06/02/2020 Document Reviewed: 06/02/2020  [employer] Patient Education © 2021 [employer]      Major Depressive Disorder, Adult  Major depressive disorder (MDD) is a mental health condition. It may also be called clinical depression or unipolar depression. MDD causes symptoms of sadness, hopelessness, and loss of interest in things. These symptoms last most of the day, almost every day, for 2 weeks. MDD can also cause physical symptoms. It can interfere with  relationships and with everyday activities, such as work, school, and activities that are usually pleasant.  MDD may be mild, moderate, or severe. It may be single-episode MDD, which happens once, or recurrent MDD, which may occur multiple times.  What are the causes?  The exact cause of this condition is not known. MDD is most likely caused by a combination of things, which may include:  · Your personality traits.  · Brutus or conditioned behaviors or thoughts or feelings that reinforce negativity.  · Any alcohol or substance misuse.  · Long-term (chronic) physical or mental health illness.  · Going through a traumatic experience or major life changes.  What increases the risk?  The following factors may make someone more likely to develop MDD:  · A family history of depression.  · Being a woman.  · Troubled family relationships.  · Abnormally low levels of certain brain chemicals.  · Traumatic or painful events in childhood, especially abuse or loss of a parent.  · A lot of stress from life experiences, such as poor living conditions or discrimination.  · Chronic physical illness or other mental health disorders.  What are the signs or symptoms?  The main symptoms of MDD usually include:  · Constant depressed or irritable mood.  · A loss of interest in things and activities.  Other symptoms include:  · Sleeping or eating too much or too little.  · Unexplained weight gain or weight loss.  · Tiredness or low energy.  · Being agitated, restless, or weak.  · Feeling hopeless, worthless, or guilty.  · Trouble thinking clearly or making decisions.  · Thoughts of suicide or thoughts of harming others.  · Isolating oneself or avoiding other people or activities.  · Trouble completing tasks, work, or any normal obligations.  Severe symptoms of this condition may include:  · Psychotic depression.This may include false beliefs, or delusions. It may also include seeing, hearing, tasting, smelling, or feeling things that are  not real (hallucinations).  · Chronic depression or persistent depressive disorder. This is low-level depression that lasts for at least 2 years.  · Melancholic depression, or feeling extremely sad and hopeless.  · Catatonic depression, which includes trouble speaking and trouble moving.  How is this diagnosed?  This condition may be diagnosed based on:  · Your symptoms.  · Your medical and mental health history. You may be asked questions about your lifestyle, including any drug and alcohol use.  · A physical exam.  · Blood tests to rule out other conditions.  MDD is confirmed if you have the following symptoms most of the day, nearly every day, in a 2-week period:  · Either a depressed mood or loss of interest.  · At least four other MDD symptoms.  How is this treated?  This condition is usually treated by mental health professionals, such as psychologists, psychiatrists, and clinical social workers. You may need more than one type of treatment. Treatment may include:  · Psychotherapy, also called talk therapy or counseling. Types of psychotherapy include:  ? Cognitive behavioral therapy (CBT). This teaches you to recognize unhealthy feelings, thoughts, and behaviors, and replace them with positive thoughts and actions.  ? Interpersonal therapy (IPT). This helps you to improve the way you communicate with others or relate to them.  ? Family therapy. This treatment includes members of your family.  · Medicines to treat anxiety and depression. These medicines help to balance the brain chemicals that affect your emotions.  · Lifestyle changes. You may be asked to:  ? Limit alcohol use and avoid drug use.  ? Get regular exercise.  ? Get plenty of sleep.  ? Make healthy eating choices.  ? Spend more time outdoors.  · Brain stimulation. This may be done if symptoms are very severe and other treatments have not worked. Examples of this treatment are electroconvulsive therapy and transcranial magnetic stimulation.  Follow  these instructions at home:  Activity  · Exercise regularly and spend time outdoors.  · Find activities that you enjoy doing, and make time to do them.  · Find healthy ways to manage stress, such as:  ? Meditation or deep breathing.  ? Spending time in nature.  ? Journaling.  · Return to your normal activities as told by your health care provider. Ask your health care provider what activities are safe for you.  Alcohol and drug use  · If you drink alcohol:  ? Limit how much you use to:  § 0-1 drink a day for women who are not pregnant.  § 0-2 drinks a day for men.  ? Be aware of how much alcohol is in your drink. In the [place] one drink equals one 12 oz bottle of beer (355 mL), one 5 oz glass of wine (148 mL), or one 1½ oz glass of hard liquor (44 mL).  ? Discuss your alcohol use with your health care provider. Alcohol can affect any antidepressant medicines you are taking.  · Discuss any drug use with your health care provider.  General instructions    · Take over-the-counter and prescription medicines only as told by your health care provider.  · Eat a healthy diet and get plenty of sleep.  · Consider joining a support group. Your health care provider may be able to recommend one.  · Keep all follow-up visits as told by your health care provider. This is important.    Where to find more information  · National [place] on Mental Illness: www.thi.org  · [place] National [place] of Mental Health: www.nimh.nih.gov  Contact a health care provider if:  · Your symptoms get worse.  · You develop new symptoms.  Get help right away if:  · You self-harm.  · You have serious thoughts about hurting yourself or others.  · You hallucinate.  If you ever feel like you may hurt yourself or others, or have thoughts about taking your own life, get help right away. Go to your nearest emergency department or:  · Call your local emergency services (911 in the [place]  · Call a suicide crisis helpline, such as the National Suicide  Prevention Lifeline at 1-791.302.7619. This is open 24 hours a day in the [place]  · Text the Crisis Text Line at 276435 (in the [place]  Summary  · Major depressive disorder (MDD) is a mental health condition. MDD causes symptoms of sadness, hopelessness, and loss of interest in things. These symptoms last most of the day, almost every day, for 2 weeks.  · The symptoms of MDD can interfere with relationships and with everyday activities.  · Treatments and support are available for people who develop MDD. You may need more than one type of treatment.  · Get help right away if you have serious thoughts about hurting yourself or others.  This information is not intended to replace advice given to you by your health care provider. Make sure you discuss any questions you have with your health care provider.  Document Revised: 11/28/2020 Document Reviewed: 11/28/2020  [employer] Patient Education © 2021 [employer]

## 2021-10-19 LAB
25(OH)D3 SERPL-MCNC: 44.7 NG/ML (ref 30–100)
ALBUMIN SERPL-MCNC: 5.2 G/DL (ref 3.5–5.2)
ALBUMIN/GLOB SERPL: 2.9 G/DL
ALP SERPL-CCNC: 54 U/L (ref 39–117)
ALT SERPL W P-5'-P-CCNC: 20 U/L (ref 1–41)
ANION GAP SERPL CALCULATED.3IONS-SCNC: 12.3 MMOL/L (ref 5–15)
AST SERPL-CCNC: 26 U/L (ref 1–40)
BILIRUB SERPL-MCNC: 0.5 MG/DL (ref 0–1.2)
BUN SERPL-MCNC: 9 MG/DL (ref 6–20)
BUN/CREAT SERPL: 10.5 (ref 7–25)
CALCIUM SPEC-SCNC: 9.4 MG/DL (ref 8.6–10.5)
CHLORIDE SERPL-SCNC: 101 MMOL/L (ref 98–107)
CHOLEST SERPL-MCNC: 188 MG/DL (ref 0–200)
CO2 SERPL-SCNC: 27.7 MMOL/L (ref 22–29)
CREAT SERPL-MCNC: 0.86 MG/DL (ref 0.76–1.27)
GFR SERPL CREATININE-BSD FRML MDRD: 102 ML/MIN/1.73
GLOBULIN UR ELPH-MCNC: 1.8 GM/DL
GLUCOSE SERPL-MCNC: 77 MG/DL (ref 65–99)
HDLC SERPL-MCNC: 49 MG/DL (ref 40–60)
IRON 24H UR-MRATE: 112 MCG/DL (ref 59–158)
IRON SATN MFR SERPL: 26 % (ref 20–50)
LDLC SERPL CALC-MCNC: 123 MG/DL (ref 0–100)
LDLC/HDLC SERPL: 2.49 {RATIO}
POTASSIUM SERPL-SCNC: 4.3 MMOL/L (ref 3.5–5.2)
PROT SERPL-MCNC: 7 G/DL (ref 6–8.5)
SODIUM SERPL-SCNC: 141 MMOL/L (ref 136–145)
TIBC SERPL-MCNC: 432 MCG/DL (ref 298–536)
TRANSFERRIN SERPL-MCNC: 290 MG/DL (ref 200–360)
TRIGL SERPL-MCNC: 86 MG/DL (ref 0–150)
TSH SERPL DL<=0.05 MIU/L-ACNC: 1.41 UIU/ML (ref 0.27–4.2)
VLDLC SERPL-MCNC: 16 MG/DL (ref 5–40)

## 2021-11-18 ENCOUNTER — OFFICE VISIT (OUTPATIENT)
Dept: SLEEP MEDICINE | Facility: HOSPITAL | Age: 36
End: 2021-11-18

## 2021-11-18 VITALS
HEART RATE: 83 BPM | BODY MASS INDEX: 20.88 KG/M2 | TEMPERATURE: 98 F | WEIGHT: 133 LBS | SYSTOLIC BLOOD PRESSURE: 121 MMHG | OXYGEN SATURATION: 100 % | DIASTOLIC BLOOD PRESSURE: 75 MMHG | HEIGHT: 67 IN

## 2021-11-18 DIAGNOSIS — F31.9 BIPOLAR AFFECTIVE DISORDER, REMISSION STATUS UNSPECIFIED (HCC): ICD-10-CM

## 2021-11-18 DIAGNOSIS — G47.10 HYPERSOMNIA: ICD-10-CM

## 2021-11-18 DIAGNOSIS — G47.8 NON-RESTORATIVE SLEEP: ICD-10-CM

## 2021-11-18 DIAGNOSIS — G47.00 INSOMNIA, UNSPECIFIED TYPE: Primary | ICD-10-CM

## 2021-11-18 PROCEDURE — 99204 OFFICE O/P NEW MOD 45 MIN: CPT | Performed by: FAMILY MEDICINE

## 2021-11-18 PROCEDURE — G0463 HOSPITAL OUTPT CLINIC VISIT: HCPCS | Performed by: FAMILY MEDICINE

## 2021-11-18 RX ORDER — ZOLPIDEM TARTRATE 5 MG/1
TABLET ORAL
Qty: 1 TABLET | Refills: 0 | OUTPATIENT
Start: 2021-11-18 | End: 2022-04-09

## 2021-11-18 NOTE — PROGRESS NOTES
[facility]ultation       Reason for Consultation: primary insomnia, fatigue      Patient Care Team:  Sandra Andersen APRN as PCP - General (Nurse Practitioner)  Milton Bowser MD as Consulting Physician (Sleep Medicine)      History of present illness:  Thank you for asking me to see your patient.  The patient is a 36 y.o. male With bipolar affective disorder with moderate manic episode last month Iron deficiency and ADHD presents today to discuss concern regarding insomnia resulting in hypersomnia nonrestorative sleep.  Insomnia has been going on for about 10 years.  Started in 2011.  Mind does not shut off.  Had a sleep study several years ago.  Was not prescribed a Pap breathing machine no tonsillectomy history.      Patient not sure if insomnia was better before and worse now.  Is seeing a psychiatrist in a few days.  Has not found a therapist he likes.  Feels like he needs to address his insomnia before he can address any mental health issues.  Not on any medication currently.  Would like to try to stay away from medication.  In the past has tried Ambien trazodone and benzodiazepine however all made him feel groggy the next day.  Does not want to be on any medications.    Sleep Schedule:  Bed time: 12:30 AM to 2 AM  Sleep latency: Hours  Wake time: 10 AM to 11 AM  Average hours slept: Not sure  Non-restorative sleep: Yes  Number of naps per day: 0  Rotating shifts?:  No  Nocturia: N  Electronics in bedroom: Y    Excessive daytime sleepiness or drowsiness:Y  Any accidents at work due to sleepiness in the last 5 years:Y  Any difficulty driving due to sleepiness or being drowsy: Y  Weight changed in the last 5 years:Y - LOST 11 LBS    Snoring:N  Witnessed apneas:N  Have you ever awakened gasping for breath, coughing, choking or respiratory discomfort: N  Morning headaches: Y  Awaken with a sore throat or dry mouth: N    Any reports of leg jerking at night: N  Urge sensations:  "N  Does pain disrupt sleep: N  Sweating during sleep: N  Teeth grinding: N    Any sudden episodes of sleep during the day: Y  Sleep paralysis/hallucinations: MAYBE  Muscle weakness with laughing/anger: N  Nightmares: N  Sleep walking: N    Are you sleepy when you increase your sleep time: N  Do you sleep better away from your own bed: N    Social History: Livery  no tobacco use no alcohol use no caffeine use no drug use    Review of Systems:    A complete review of systems was done and all were negative with the exception of frequent urination sores in mouth nasal congestion secondary to allergies fatigue swollen ankles anxiety depression problem swallowing abdominal bloating bruises easily    Allergies:  Patient has no known allergies.    Family History: KATE no       Current Outpatient Medications:   •  zolpidem (Ambien) 5 MG tablet, Take one tab as needed for sleep at night of sleep study only. Do not use at home., Disp: 1 tablet, Rfl: 0    Vital Signs:    Vitals:    11/18/21 1500   BP: 121/75   Pulse: 83   Temp: 98 °F (36.7 °C)   SpO2: 100%   Weight: 60.3 kg (133 lb)   Height: 170.2 cm (67\")      Body mass index is 20.83 kg/m².         Physical Exam:   General Alert and oriented. No acute distress noted   Pharynx/Throat Moist mucous membranes.   Head Normocephalic. Symmetrical. Atraumatic.    Nose No septal deviation. No drainage   Chest Wall Normal shape. Symmetric expansion with respiration.        Lungs  Respirations regular, even and unlabored.   Heart Regular rhythm and normal rate. Normal S1 and S2. No murmur   Abdomen No masses.   Extremities Moves all extremities well. No edema   Psychiatric  rapid and pressured speech       Impression:  1. Insomnia, unspecified type    2. Non-restorative sleep    3. Bipolar affective disorder, remission status unspecified ([facility])    4. Hypersomnia        Plan:    Good sleep hygiene measures should be maintained.      I discussed the pathophysiology of obstructive " sleep apnea with the patient.  We discussed the adverse outcomes associated with untreated sleep-disordered breathing.  We discussed treatment modalities of obstructive sleep apnea including CPAP device as well as oral mandibular advancement device. Sleep study will be scheduled to establish definitive diagnosis of sleep disorder breathing.  Caution during activities that require prolonged concentration is strongly advised.  Patient will be notified of sleep study results after sleep study is completed.      Prescription for Ambien was provided to bring to the Sleep lab to improve sleep efficiency.  Patient was asked to not take the Ambien at home.    Discussed considering CBT-I with Dr. Rodrick Welch if sleep study negative.    Advised patient to continue care per psychiatry and consider seeing a therapist as well.    Thank you for allowing me to participate in your patient's care.    Milton Bowser MD  Sleep Medicine  11/18/21  15:49 EST

## 2022-12-20 ENCOUNTER — HOSPITAL ENCOUNTER (EMERGENCY)
Facility: HOSPITAL | Age: 37
Discharge: HOME | End: 2022-12-20
Attending: EMERGENCY MEDICINE | Admitting: EMERGENCY MEDICINE

## 2022-12-20 VITALS
OXYGEN SATURATION: 100 % | HEART RATE: 65 BPM | DIASTOLIC BLOOD PRESSURE: 74 MMHG | SYSTOLIC BLOOD PRESSURE: 113 MMHG | RESPIRATION RATE: 18 BRPM | TEMPERATURE: 98.5 F

## 2022-12-20 DIAGNOSIS — S61.211A LACERATION OF LEFT INDEX FINGER WITHOUT FOREIGN BODY WITHOUT DAMAGE TO NAIL, INITIAL ENCOUNTER: Primary | ICD-10-CM

## 2022-12-20 PROCEDURE — 99282 EMERGENCY DEPT VISIT SF MDM: CPT

## 2022-12-20 RX ORDER — LIDOCAINE HYDROCHLORIDE 10 MG/ML
10 INJECTION, SOLUTION INFILTRATION; PERINEURAL ONCE
Status: DISCONTINUED | OUTPATIENT
Start: 2022-12-20 | End: 2022-12-20 | Stop reason: HOSPADM

## 2022-12-20 RX ORDER — LIDOCAINE HYDROCHLORIDE 10 MG/ML
10 INJECTION, SOLUTION EPIDURAL; INFILTRATION; INTRACAUDAL; PERINEURAL ONCE
Status: DISCONTINUED | OUTPATIENT
Start: 2022-12-20 | End: 2022-12-20

## 2022-12-21 NOTE — ED PROVIDER NOTES
Subjective     History provided by:  Patient  Finger Laceration  Location:  L medial index finger  Quality:  Sharp  Severity:  Moderate  Onset quality:  Sudden  Duration:  1 hour  Timing:  Constant  Progression:  Unchanged  Chronicity:  New  Context:  Pt was using putty knife like tool and it slipped causing laceration to left index finger  Relieved by:  Nothing  Worsened by:  Nothing  Ineffective treatments:  Pressure/bandage  Associated symptoms: no abdominal pain, no chest pain, no congestion, no cough, no diarrhea, no ear pain, no fever, no headaches, no nausea, no shortness of breath, no sore throat and no vomiting        Review of Systems   Constitutional: Negative for chills and fever.   HENT: Negative for congestion, ear pain and sore throat.    Eyes: Negative for pain.   Respiratory: Negative for cough, chest tightness and shortness of breath.    Cardiovascular: Negative for chest pain.   Gastrointestinal: Negative for abdominal pain, diarrhea, nausea and vomiting.   Genitourinary: Negative for flank pain and hematuria.   Musculoskeletal: Negative for joint swelling.   Skin: Positive for wound. Negative for pallor.   Neurological: Negative for seizures and headaches.   All other systems reviewed and are negative.      Past Medical History:   Diagnosis Date   • ADD (attention deficit disorder)    • Anxiety        No Known Allergies    Past Surgical History:   Procedure Laterality Date   • COLONOSCOPY         Family History   Problem Relation Age of Onset   • Diabetes Father        Social History     Socioeconomic History   • Marital status: Single   Tobacco Use   • Smoking status: Never   • Smokeless tobacco: Never   Vaping Use   • Vaping Use: Never used   Substance and Sexual Activity   • Alcohol use: Never   • Drug use: Defer   • Sexual activity: Defer           Objective   Physical Exam  Vitals and nursing note reviewed.   Constitutional:       General: He is not in acute distress.     Appearance: Normal  appearance. He is not toxic-appearing.   HENT:      Head: Normocephalic and atraumatic.      Mouth/Throat:      Mouth: Mucous membranes are moist.   Eyes:      General: No scleral icterus.  Cardiovascular:      Rate and Rhythm: Normal rate and regular rhythm.      Pulses: Normal pulses.      Heart sounds: Normal heart sounds.   Pulmonary:      Effort: Pulmonary effort is normal. No respiratory distress.      Breath sounds: Normal breath sounds.   Abdominal:      General: Abdomen is flat.      Palpations: Abdomen is soft.      Tenderness: There is no abdominal tenderness.   Musculoskeletal:         General: Normal range of motion.      Left hand: Laceration present. No swelling or deformity. Normal range of motion. Normal strength. Normal sensation. There is no disruption of two-point discrimination. Normal capillary refill. Normal pulse.        Hands:       Cervical back: Normal range of motion and neck supple.   Skin:     General: Skin is warm and dry.      Findings: Laceration present.   Neurological:      Mental Status: He is alert and oriented to person, place, and time. Mental status is at baseline.         Laceration Repair  Performed by: Epifanio Pickett APRN  Authorized by: Esteban Hester DO     Consent:     Consent obtained:  Verbal    Consent given by:  Patient    Risks, benefits, and alternatives were discussed: yes      Risks discussed:  Infection, pain, poor cosmetic result, poor wound healing, retained foreign body and need for additional repair    Alternatives discussed:  Referral  [place] protocol:     Procedure explained and questions answered to patient or proxy's satisfaction: yes      Relevant documents present and verified: no      Test results available: no      Imaging studies available: no      Required blood products, implants, devices, and special equipment available: no      Site/side marked: no      Immediately prior to procedure, a time out was called: no      Patient identity  confirmed:  Verbally with patient and arm band  Anesthesia:     Anesthesia method:  Local infiltration    Local anesthetic:  Lidocaine 1% w/o epi  Laceration details:     Length (cm):  2    Depth (mm):  8  Pre-procedure details:     Preparation:  Patient was prepped and draped in usual sterile fashion  Exploration:     Hemostasis achieved with:  Direct pressure    Wound exploration: wound explored through full range of motion and entire depth of wound visualized      Wound extent: no foreign bodies/material noted      Contaminated: no    Treatment:     Area cleansed with:  Povidone-iodine    Amount of cleaning:  Standard    Irrigation solution:  Sterile saline    Irrigation method:  Syringe  Skin repair:     Repair method:  Sutures    Suture size:  5-0    Suture material:  Nylon    Suture technique:  Simple interrupted    Number of sutures:  5  Approximation:     Approximation:  Close  Repair type:     Repair type:  Simple  Post-procedure details:     Dressing:  Antibiotic ointment    Procedure completion:  Tolerated well, no immediate complications               ED Course                                           MDM  Number of Diagnoses or Management Options  Laceration of left index finger without foreign body without damage to nail, initial encounter: new and does not require workup  Diagnosis management comments: The patient presented with a laceration in need of repair. See laceration repair note for details. The wound was irrigated with copious normal saline irrigation. 5 sutures were used to approximate the wound edges. Pt declined Tetanus. The patient tolerated the procedure well. Acute bleeding has ceased and the wound was approximated in the emergency department. Patient was counseled to keep the wound clean, dry, and out of the sun. Patient was counseled to change dressings daily. Patient was advised to return to the ED for worsening erythema, pain, swelling, fever, excessive drainage or signs of  infection. They were counseled to follow up for suture removal as described in the discharge instructions. Patient verbalizes understanding and agrees to follow up as instructed.    Risk of Complications, Morbidity, and/or Mortality  Presenting problems: low  Diagnostic procedures: minimal  Management options: low    Patient Progress  Patient progress: improved      Final diagnoses:   Laceration of left index finger without foreign body without damage to nail, initial encounter       ED Disposition  ED Disposition     ED Disposition   Discharge    Condition   Stable    Comment   --             Provider, No Known  [facility] [id number]    In 1 week  For suture removal    [facility]  [address]  [facility] [address]  [phone]  Schedule an appointment as soon as possible for a visit            Medication List      No changes were made to your prescriptions during this visit.          Epifanio Pickett, SKYE  12/21/22 0225       Epifanio Pickett, SKYE  12/28/22 1922

## 2023-03-13 ENCOUNTER — OFFICE VISIT (OUTPATIENT)
Dept: FAMILY MEDICINE CLINIC | Facility: CLINIC | Age: 38
End: 2023-03-13
Payer: COMMERCIAL

## 2023-03-13 VITALS
HEIGHT: 68 IN | HEART RATE: 62 BPM | DIASTOLIC BLOOD PRESSURE: 65 MMHG | BODY MASS INDEX: 21.32 KG/M2 | TEMPERATURE: 97.8 F | SYSTOLIC BLOOD PRESSURE: 117 MMHG | WEIGHT: 140.7 LBS | OXYGEN SATURATION: 100 %

## 2023-03-13 DIAGNOSIS — R82.998 FOAMY URINE: ICD-10-CM

## 2023-03-13 DIAGNOSIS — Z00.00 ANNUAL PHYSICAL EXAM: Primary | ICD-10-CM

## 2023-03-13 DIAGNOSIS — D64.9 ANEMIA, UNSPECIFIED TYPE: ICD-10-CM

## 2023-03-13 DIAGNOSIS — R18.8 ABDOMINAL WALL FLUID COLLECTIONS: ICD-10-CM

## 2023-03-13 DIAGNOSIS — Z11.3 SCREENING FOR STD (SEXUALLY TRANSMITTED DISEASE): ICD-10-CM

## 2023-03-13 PROBLEM — K46.9 ABDOMINAL HERNIA: Status: ACTIVE | Noted: 2023-03-13

## 2023-03-13 PROBLEM — J30.9 ALLERGIC RHINITIS: Status: ACTIVE | Noted: 2023-03-13

## 2023-03-13 PROBLEM — J34.89 OTHER DISEASES OF NASAL CAVITY AND SINUSES: Status: ACTIVE | Noted: 2023-03-13

## 2023-03-13 PROBLEM — K64.9 HEMORRHOIDS: Status: ACTIVE | Noted: 2023-03-13

## 2023-03-13 PROBLEM — A64 STD (MALE): Status: ACTIVE | Noted: 2023-03-13

## 2023-03-13 PROBLEM — R45.1 AGITATION: Status: ACTIVE | Noted: 2023-03-13

## 2023-03-13 PROBLEM — R41.3 MEMORY LOSS: Status: ACTIVE | Noted: 2023-03-13

## 2023-03-13 PROBLEM — F41.9 ANXIETY: Status: ACTIVE | Noted: 2023-03-13

## 2023-03-13 PROBLEM — H91.90 DEAFNESS: Status: ACTIVE | Noted: 2023-03-13

## 2023-03-13 PROBLEM — R61 NIGHT SWEATS: Status: ACTIVE | Noted: 2023-03-13

## 2023-03-13 PROBLEM — F99 PSYCHIATRIC PROBLEM: Status: ACTIVE | Noted: 2023-03-13

## 2023-03-13 LAB
AMORPH URATE CRY URNS QL MICRO: NORMAL /HPF
BACTERIA UR QL AUTO: NORMAL /HPF
BASOPHILS # BLD AUTO: 0.02 10*3/MM3 (ref 0–0.2)
BASOPHILS NFR BLD AUTO: 0.7 % (ref 0–1.5)
BILIRUB UR QL STRIP: NEGATIVE
CLARITY UR: ABNORMAL
COLOR UR: YELLOW
DEPRECATED RDW RBC AUTO: 38 FL (ref 37–54)
EOSINOPHIL # BLD AUTO: 0.1 10*3/MM3 (ref 0–0.4)
EOSINOPHIL NFR BLD AUTO: 3.6 % (ref 0.3–6.2)
ERYTHROCYTE [DISTWIDTH] IN BLOOD BY AUTOMATED COUNT: 12.1 % (ref 12.3–15.4)
GLUCOSE UR STRIP-MCNC: NEGATIVE MG/DL
HCT VFR BLD AUTO: 43.8 % (ref 37.5–51)
HGB BLD-MCNC: 14.7 G/DL (ref 13–17.7)
HGB UR QL STRIP.AUTO: NEGATIVE
HYALINE CASTS UR QL AUTO: NORMAL /LPF
IMM GRANULOCYTES # BLD AUTO: 0.01 10*3/MM3 (ref 0–0.05)
IMM GRANULOCYTES NFR BLD AUTO: 0.4 % (ref 0–0.5)
KETONES UR QL STRIP: NEGATIVE
LEUKOCYTE ESTERASE UR QL STRIP.AUTO: NEGATIVE
LYMPHOCYTES # BLD AUTO: 1.37 10*3/MM3 (ref 0.7–3.1)
LYMPHOCYTES NFR BLD AUTO: 48.9 % (ref 19.6–45.3)
MCH RBC QN AUTO: 29.4 PG (ref 26.6–33)
MCHC RBC AUTO-ENTMCNC: 33.6 G/DL (ref 31.5–35.7)
MCV RBC AUTO: 87.6 FL (ref 79–97)
MONOCYTES # BLD AUTO: 0.24 10*3/MM3 (ref 0.1–0.9)
MONOCYTES NFR BLD AUTO: 8.6 % (ref 5–12)
NEUTROPHILS NFR BLD AUTO: 1.06 10*3/MM3 (ref 1.7–7)
NEUTROPHILS NFR BLD AUTO: 37.8 % (ref 42.7–76)
NITRITE UR QL STRIP: NEGATIVE
NRBC BLD AUTO-RTO: 0 /100 WBC (ref 0–0.2)
PH UR STRIP.AUTO: 8.5 [PH] (ref 5–8)
PLATELET # BLD AUTO: 214 10*3/MM3 (ref 140–450)
PMV BLD AUTO: 9.4 FL (ref 6–12)
PROT UR QL STRIP: ABNORMAL
RBC # BLD AUTO: 5 10*6/MM3 (ref 4.14–5.8)
RBC # UR STRIP: NORMAL /HPF
REF LAB TEST METHOD: NORMAL
SP GR UR STRIP: 1.02 (ref 1–1.03)
SQUAMOUS #/AREA URNS HPF: NORMAL /HPF
UROBILINOGEN UR QL STRIP: ABNORMAL
WBC # UR STRIP: NORMAL /HPF
WBC NRBC COR # BLD: 2.8 10*3/MM3 (ref 3.4–10.8)

## 2023-03-13 PROCEDURE — 84439 ASSAY OF FREE THYROXINE: CPT | Performed by: NURSE PRACTITIONER

## 2023-03-13 PROCEDURE — 86803 HEPATITIS C AB TEST: CPT | Performed by: NURSE PRACTITIONER

## 2023-03-13 PROCEDURE — 81001 URINALYSIS AUTO W/SCOPE: CPT | Performed by: NURSE PRACTITIONER

## 2023-03-13 PROCEDURE — 99395 PREV VISIT EST AGE 18-39: CPT | Performed by: NURSE PRACTITIONER

## 2023-03-13 PROCEDURE — 2014F MENTAL STATUS ASSESS: CPT | Performed by: NURSE PRACTITIONER

## 2023-03-13 PROCEDURE — 3008F BODY MASS INDEX DOCD: CPT | Performed by: NURSE PRACTITIONER

## 2023-03-13 PROCEDURE — 83540 ASSAY OF IRON: CPT | Performed by: NURSE PRACTITIONER

## 2023-03-13 PROCEDURE — 84443 ASSAY THYROID STIM HORMONE: CPT | Performed by: NURSE PRACTITIONER

## 2023-03-13 PROCEDURE — 85025 COMPLETE CBC W/AUTO DIFF WBC: CPT | Performed by: NURSE PRACTITIONER

## 2023-03-13 PROCEDURE — 82728 ASSAY OF FERRITIN: CPT | Performed by: NURSE PRACTITIONER

## 2023-03-13 PROCEDURE — 86592 SYPHILIS TEST NON-TREP QUAL: CPT | Performed by: NURSE PRACTITIONER

## 2023-03-13 PROCEDURE — 84466 ASSAY OF TRANSFERRIN: CPT | Performed by: NURSE PRACTITIONER

## 2023-03-13 PROCEDURE — 80061 LIPID PANEL: CPT | Performed by: NURSE PRACTITIONER

## 2023-03-13 PROCEDURE — 86706 HEP B SURFACE ANTIBODY: CPT | Performed by: NURSE PRACTITIONER

## 2023-03-13 PROCEDURE — 80053 COMPREHEN METABOLIC PANEL: CPT | Performed by: NURSE PRACTITIONER

## 2023-03-13 PROCEDURE — 36415 COLL VENOUS BLD VENIPUNCTURE: CPT | Performed by: NURSE PRACTITIONER

## 2023-03-13 PROCEDURE — G0432 EIA HIV-1/HIV-2 SCREEN: HCPCS | Performed by: NURSE PRACTITIONER

## 2023-03-13 NOTE — PROGRESS NOTES
"Chief Complaint  Dysuria (\" Foamy Urine \", Ongoing issue  ), Sexual Problem (Would like STD check, Hx of STD ), and Bloated (Ongoing issue )    Subjective          Dominik Montiel, 37 y.o. male presents to [facility]  History of Present Illness   Patient presents today as a new patient to establish care.  He is a previous patient of SKYE Castillo.    He is requesting labs, STD testing, and is concerned with foamy urine and \"water retention in belly.\"  He states he wants an overall checkup with labs.  He states he wants \"all his vitamins checked\". He states he has had iron deficiency in the past. He wants STD testing.  He states he has had trichomonas in the past, denies any symptoms currently but wants to be checked.  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  He denies any lesions on his penis.  He denies any penile discharge.  He is concerned about \"foamy urine\" but denies any dysuria.    PHQ-2 Depression Screening  Little interest or pleasure in doing things? 0-->not at all   Feeling down, depressed, or hopeless? 0-->not at all   PHQ-2 Total Score 0        Tobacco Use: Low Risk    • Smoking Tobacco Use: Never   • Smokeless Tobacco Use: Never   • Passive Exposure: Not on file      Objective   Vital Signs:   /65 (BP Location: Left arm, Patient Position: Sitting, Cuff Size: Adult)   Pulse 62   Temp 97.8 °F (36.6 °C)   Ht 172.7 cm (68\")   Wt 63.8 kg (140 lb 11.2 oz)   SpO2 100%   BMI 21.39 kg/m²     No current outpatient medications on file.   Past Medical History:   Diagnosis Date   • ADD (attention deficit disorder)    • Anxiety    • Irritable bowel syndrome       Physical Exam  Vitals reviewed.   Constitutional:       Appearance: Normal appearance. He is well-developed.   HENT:      Right Ear: Tympanic membrane, ear canal and external ear normal.      Left Ear: Tympanic membrane, ear canal and external ear normal.      Ears:      " Comments: Small amount of earwax noted in right ear.     Mouth/Throat:      Mouth: Mucous membranes are moist.      Pharynx: No pharyngeal swelling, oropharyngeal exudate or posterior oropharyngeal erythema.   Neck:      Thyroid: No thyroid mass, thyromegaly or thyroid tenderness.   Cardiovascular:      Rate and Rhythm: Normal rate and regular rhythm.      Heart sounds: No murmur heard.    No friction rub. No gallop.   Pulmonary:      Effort: Pulmonary effort is normal.      Breath sounds: Normal breath sounds. No wheezing or rhonchi.   Lymphadenopathy:      Cervical: No cervical adenopathy.   Skin:     General: Skin is warm and dry.   Neurological:      Mental Status: He is alert and oriented to person, place, and time.      Cranial Nerves: No cranial nerve deficit.   Psychiatric:         Mood and Affect: Mood and affect normal.         Behavior: Behavior normal.         Thought Content: Thought content normal. Thought content does not include homicidal or suicidal ideation.         Judgment: Judgment normal.        Result Review :   {The following data was reviewed by SKYE Turner  Component  Ref Range & Units 1 yr ago  (10/18/21) 1 yr ago  (4/27/21)   WBC  3.40 - 10.80 10*3/mm3 5.25  3.87 Low  R    RBC  4.14 - 5.80 10*6/mm3 4.70  4.74 R    Hemoglobin  13.0 - 17.7 g/dL 14.1  14.2 R    Hematocrit  37.5 - 51.0 % 42.1  42.6 R    MCV  79.0 - 97.0 fL 89.6  89.9 R    MCH  26.6 - 33.0 pg 30.0  30.0 R    MCHC  31.5 - 35.7 g/dL 33.5  33.3 R    RDW  12.3 - 15.4 % 12.5  12.6 R    RDW-SD  37.0 - 54.0 fl 41.6  41.5 R    MPV  6.0 - 12.0 fL 9.1  9.2 Low  R    Platelets  140 - 450 10*3/mm3 181  207 R    Neutrophil %  42.7 - 76.0 % 46.7  51.4 R    Lymphocyte %  19.6 - 45.3 % 43.4  36.7 R    Monocyte %  5.0 - 12.0 % 8.2  9.8 R    Eosinophil %  0.3 - 6.2 % 1.1  1.3 R    Basophil %  0.0 - 1.5 % 0.4  0.5 R    Immature Grans %  0.0 - 0.5 % 0.2  0.3 R    Neutrophils, Absolute  1.70 - 7.00 10*3/mm3 2.45  1.99 Low  R     Lymphocytes, Absolute  0.70 - 3.10 10*3/mm3 2.28  1.42 R    Monocytes, Absolute  0.10 - 0.90 10*3/mm3 0.43  0.38 R    Eosinophils, Absolute  0.00 - 0.40 10*3/mm3 0.06  0.05 R    Basophils, Absolute  0.00 - 0.20 10*3/mm3 0.02  0.02 R    Immature Grans, Absolute  0.00 - 0.05 10*3/mm3 0.01     nRBC  0.0 - 0.2 /100 WBC 0.0       Component  Ref Range & Units 1 yr ago  (10/18/21) 1 yr ago  (4/27/21)   Glucose  65 - 99 mg/dL 77  85 R    BUN  6 - 20 mg/dL 9  10 R    Creatinine  0.76 - 1.27 mg/dL 0.86  0.91 R    Sodium  136 - 145 mmol/L 141  140 R    Potassium  3.5 - 5.2 mmol/L 4.3  4.1 R    Chloride  98 - 107 mmol/L 101  101 R    CO2  22.0 - 29.0 mmol/L 27.7     Calcium  8.6 - 10.5 mg/dL 9.4  9.5 R    Total Protein  6.0 - 8.5 g/dL 7.0  7.0 R, CM    Albumin  3.50 - 5.20 g/dL 5.20  4.5 R    ALT (SGPT)  1 - 41 U/L 20  14 R    AST (SGOT)  1 - 40 U/L 26  23 R    Alkaline Phosphatase  39 - 117 U/L 54  49 Low  R    Total Bilirubin  0.0 - 1.2 mg/dL 0.5  0.44 R    eGFR Non African Amer  >60 mL/min/1.73 102     Globulin  gm/dL 1.8  2.5 R    A/G Ratio  g/dL 2.9  1.8 R    BUN/Creatinine Ratio  7.0 - 25.0 10.5  11 R    Anion Gap  5.0 - 15.0 mmol/L 12.3  15 R      Component  Ref Range & Units 1 yr ago   Iron  70 - 180 ug/dL 65 Low     TIBC  245 - 450 ug/dL 355    Comment: As of 10/15/03, the chemistry department began utilizing a Transferrin   assay. Data suggests that Transferrin is a better estimate of Total Iron   binding capacity than the TIBC assay. As a result the TIBC will now be a   calculated estimate from the measured Transferrin.    Iron Saturation  20 - 55 % 18 Low     Transferrin  215.00 - 365.00 mg/dL 248.00      Component  Ref Range & Units 1 yr ago   Magnesium  1.60 - 2.30 mg/dL 1.99      Ref Range & Units 1 yr ago   Total Cholesterol  0 - 200 mg/dL 188    Triglycerides  0 - 150 mg/dL 86    HDL Cholesterol  40 - 60 mg/dL 49    LDL Cholesterol   0 - 100 mg/dL 123 High     VLDL Cholesterol  5 - 40 mg/dL 16    LDL/HDL  Ratio 2.49      Component  Ref Range & Units 1 yr ago  (10/18/21) 1 yr ago  (4/27/21)   Folate  4.78 - 24.20 ng/mL >20.00  >20.0 R, CM    Vitamin B-12  211 - 946 pg/mL 643  679 R      Component  Ref Range & Units 1 yr ago  (10/18/21) 1 yr ago  (4/27/21)   25 Hydroxy, Vitamin D  30.0 - 100.0 ng/ml 44.7  39.7 R, CM      Component  Ref Range & Units 1 yr ago   HSV 1 IgG, Type Specific  0.00 - 0.90 index 49.20 High     Comment:                                                 Negative        <0.91                                                   Equivocal 0.91 - 1.09                                                   Positive        >1.09                   Note: Negative indicates no antibodies detected to                   HSV-1. Equivocal may suggest early infection.  If                   clinically appropriate, retest at later date. Positive                   indicates antibodies detected to HSV-1.    HSV 2 IgG  0.00 - 0.90 index <0.91    Comment:                                                 Negative        <0.91                                                   Equivocal 0.91 - 1.09                                                   Positive        >1.09                   Note: Negative indicates no antibodies detected to                   HSV-2. Equivocal may suggest early infection.  If                   clinically appropriate, retest at later date. Positive                   indicates antibodies detected to HSV-2.               Specimen Collected: 04/27/21 13:53 EDT Last Resulted: 04/28/21 09:12 EDT               Assessment and Plan    Diagnoses and all orders for this visit:    1. Annual physical exam (Primary)  Comments:  Discussed and reviewed Cardiovascular Disease Screening Tests (lipid screening), screening labs and PSA starting age 50, earlier if any FH (pt has no FH).   Orders:  -     TSH+Free T4  -     CBC Auto Differential  -     Comprehensive Metabolic Panel  -     Lipid Panel    2. Screening for STD  (sexually transmitted disease)  Comments:  Advised that trichomonas will have to be tested at the hospital lab, order placed for urine for chlamydia, gonorrhea and trichomonas.  Orders:  -     HIV-1 / O / 2 Ag / Antibody 4th Generation  -     Hepatitis C Antibody  -     Hepatitis B Surface Antibody  -     RPR  -     Chlamydia trachomatis, Neisseria gonorrhoeae, Trichomonas vaginalis, PCR - Urine, Urine, Random Void; Future    3. Foamy urine  Comments:  We will collect a urinalysis with microscopy.  Orders:  -     Urinalysis With Microscopic - Urine, Clean Catch    4. Abdominal wall fluid collections  Comments:  We discussed options CT scan and/or colonoscopy, patient does not want to have done at this time.  Orders:  -     Urinalysis With Microscopic - Urine, Clean Catch    5. Anemia, unspecified type  Comments:  We will check iron levels today with his labs.  Orders:  -     Iron Profile  -     Ferritin        Follow Up   Return in about 1 week (around 3/20/2023) for 30 min apt for complex pt to discuss lab results.  Patient was given instructions and counseling regarding his condition or for health maintenance advice. Please see specific information pulled into the AVS if appropriate.     Parts of this note are electronic transcriptions/translations of spoken language to printed text using the Dragon Dictation system.      Latrice Torres, APRN  03/13/2023

## 2023-03-14 LAB
ALBUMIN SERPL-MCNC: 4.8 G/DL (ref 3.5–5.2)
ALBUMIN/GLOB SERPL: 1.6 G/DL
ALP SERPL-CCNC: 59 U/L (ref 39–117)
ALT SERPL W P-5'-P-CCNC: 14 U/L (ref 1–41)
ANION GAP SERPL CALCULATED.3IONS-SCNC: 9 MMOL/L (ref 5–15)
AST SERPL-CCNC: 27 U/L (ref 1–40)
BILIRUB SERPL-MCNC: 0.4 MG/DL (ref 0–1.2)
BUN SERPL-MCNC: 11 MG/DL (ref 6–20)
BUN/CREAT SERPL: 11.5 (ref 7–25)
CALCIUM SPEC-SCNC: 9.4 MG/DL (ref 8.6–10.5)
CHLORIDE SERPL-SCNC: 103 MMOL/L (ref 98–107)
CHOLEST SERPL-MCNC: 178 MG/DL (ref 0–200)
CO2 SERPL-SCNC: 29 MMOL/L (ref 22–29)
CREAT SERPL-MCNC: 0.96 MG/DL (ref 0.76–1.27)
EGFRCR SERPLBLD CKD-EPI 2021: 105.7 ML/MIN/1.73
FERRITIN SERPL-MCNC: 95.9 NG/ML (ref 30–400)
GLOBULIN UR ELPH-MCNC: 3 GM/DL
GLUCOSE SERPL-MCNC: 84 MG/DL (ref 65–99)
HBV SURFACE AB SER RIA-ACNC: NORMAL
HCV AB SER DONR QL: NORMAL
HDLC SERPL-MCNC: 40 MG/DL (ref 40–60)
HIV1+2 AB SER QL: NORMAL
IRON 24H UR-MRATE: 166 MCG/DL (ref 59–158)
IRON SATN MFR SERPL: 39 % (ref 20–50)
LDLC SERPL CALC-MCNC: 123 MG/DL (ref 0–100)
LDLC/HDLC SERPL: 3.05 {RATIO}
POTASSIUM SERPL-SCNC: 4.1 MMOL/L (ref 3.5–5.2)
PROT SERPL-MCNC: 7.8 G/DL (ref 6–8.5)
RPR SER QL: NORMAL
SODIUM SERPL-SCNC: 141 MMOL/L (ref 136–145)
T4 FREE SERPL-MCNC: 1.29 NG/DL (ref 0.93–1.7)
TIBC SERPL-MCNC: 428 MCG/DL (ref 298–536)
TRANSFERRIN SERPL-MCNC: 287 MG/DL (ref 200–360)
TRIGL SERPL-MCNC: 81 MG/DL (ref 0–150)
TSH SERPL DL<=0.05 MIU/L-ACNC: 1.54 UIU/ML (ref 0.27–4.2)
VLDLC SERPL-MCNC: 15 MG/DL (ref 5–40)

## 2023-03-17 ENCOUNTER — LAB (OUTPATIENT)
Dept: LAB | Facility: HOSPITAL | Age: 38
End: 2023-03-17
Payer: COMMERCIAL

## 2023-03-17 DIAGNOSIS — Z11.3 SCREENING FOR STD (SEXUALLY TRANSMITTED DISEASE): ICD-10-CM

## 2023-03-17 PROCEDURE — 87491 CHLMYD TRACH DNA AMP PROBE: CPT

## 2023-03-17 PROCEDURE — 87661 TRICHOMONAS VAGINALIS AMPLIF: CPT

## 2023-03-17 PROCEDURE — 87591 N.GONORRHOEAE DNA AMP PROB: CPT

## 2023-03-20 ENCOUNTER — OFFICE VISIT (OUTPATIENT)
Dept: FAMILY MEDICINE CLINIC | Facility: CLINIC | Age: 38
End: 2023-03-20
Payer: COMMERCIAL

## 2023-03-20 VITALS
HEIGHT: 68 IN | WEIGHT: 141.3 LBS | HEART RATE: 67 BPM | TEMPERATURE: 98.1 F | SYSTOLIC BLOOD PRESSURE: 105 MMHG | DIASTOLIC BLOOD PRESSURE: 59 MMHG | OXYGEN SATURATION: 100 % | BODY MASS INDEX: 21.41 KG/M2

## 2023-03-20 DIAGNOSIS — R19.05 ABDOMINAL SWELLING, PERIUMBILICAL REGION: ICD-10-CM

## 2023-03-20 DIAGNOSIS — R82.998 FOAMY URINE: ICD-10-CM

## 2023-03-20 DIAGNOSIS — D72.819 LEUKOPENIA, UNSPECIFIED TYPE: Primary | ICD-10-CM

## 2023-03-20 LAB
BASOPHILS # BLD AUTO: 0.02 10*3/MM3 (ref 0–0.2)
BASOPHILS NFR BLD AUTO: 0.4 % (ref 0–1.5)
C TRACH RRNA SPEC QL NAA+PROBE: NEGATIVE
DEPRECATED RDW RBC AUTO: 38.2 FL (ref 37–54)
EOSINOPHIL # BLD AUTO: 0.17 10*3/MM3 (ref 0–0.4)
EOSINOPHIL NFR BLD AUTO: 3.3 % (ref 0.3–6.2)
ERYTHROCYTE [DISTWIDTH] IN BLOOD BY AUTOMATED COUNT: 11.9 % (ref 12.3–15.4)
HCT VFR BLD AUTO: 41.8 % (ref 37.5–51)
HGB BLD-MCNC: 14.2 G/DL (ref 13–17.7)
IMM GRANULOCYTES # BLD AUTO: 0.02 10*3/MM3 (ref 0–0.05)
IMM GRANULOCYTES NFR BLD AUTO: 0.4 % (ref 0–0.5)
LYMPHOCYTES # BLD AUTO: 1.94 10*3/MM3 (ref 0.7–3.1)
LYMPHOCYTES NFR BLD AUTO: 37.5 % (ref 19.6–45.3)
MCH RBC QN AUTO: 29.6 PG (ref 26.6–33)
MCHC RBC AUTO-ENTMCNC: 34 G/DL (ref 31.5–35.7)
MCV RBC AUTO: 87.1 FL (ref 79–97)
MONOCYTES # BLD AUTO: 0.4 10*3/MM3 (ref 0.1–0.9)
MONOCYTES NFR BLD AUTO: 7.7 % (ref 5–12)
N GONORRHOEA RRNA SPEC QL NAA+PROBE: NEGATIVE
NEUTROPHILS NFR BLD AUTO: 2.62 10*3/MM3 (ref 1.7–7)
NEUTROPHILS NFR BLD AUTO: 50.7 % (ref 42.7–76)
NRBC BLD AUTO-RTO: 0 /100 WBC (ref 0–0.2)
PLATELET # BLD AUTO: 205 10*3/MM3 (ref 140–450)
PMV BLD AUTO: 9.1 FL (ref 6–12)
RBC # BLD AUTO: 4.8 10*6/MM3 (ref 4.14–5.8)
T VAGINALIS RRNA SPEC QL NAA+PROBE: NEGATIVE
WBC NRBC COR # BLD: 5.17 10*3/MM3 (ref 3.4–10.8)

## 2023-03-20 PROCEDURE — 85025 COMPLETE CBC W/AUTO DIFF WBC: CPT | Performed by: NURSE PRACTITIONER

## 2023-03-20 NOTE — PROGRESS NOTES
"Chief Complaint  Abnormal Lab (Discuss lab results )    Subjective          Dominik Montiel, 37 y.o. male presents to [facility]  History of Present Illness   Patient presents today to discuss lab results.    WBCs are slightly low at 2.8.  His iron level was slightly elevated.  He had a previously low WBC on 4/27/2021 but the repeated CBC WBC was normal on 10/18/2021.  He denies any known history of any cancers or blood disorders in his family.    LDL slightly at 123 otherwise lipid panel is normal.    He continues to complain of swelling in his abdomen area that comes and goes and wants to be further checked out.  He denies any pain.  He does consistently complain of \"foamy urine\" and is concerned that his last urinalysis showed trace of protein.  He did go to the hospital lab to give a urine for chlamydia/gonorrhea/trichomonas but the results are still pending.     Tobacco Use: Low Risk    • Smoking Tobacco Use: Never   • Smokeless Tobacco Use: Never   • Passive Exposure: Not on file      Objective   Vital Signs:   /59 (BP Location: Left arm, Patient Position: Sitting, Cuff Size: Adult)   Pulse 67   Temp 98.1 °F (36.7 °C)   Ht 172.7 cm (68\")   Wt 64.1 kg (141 lb 4.8 oz)   SpO2 100%   BMI 21.48 kg/m²     No current outpatient medications on file.   Past Medical History:   Diagnosis Date   • ADD (attention deficit disorder)    • Anxiety    • Irritable bowel syndrome       Physical Exam  Vitals reviewed.   Constitutional:       Appearance: Normal appearance. He is well-developed.   Neck:      Thyroid: No thyroid mass, thyromegaly or thyroid tenderness.   Cardiovascular:      Rate and Rhythm: Normal rate and regular rhythm.      Heart sounds: No murmur heard.    No friction rub. No gallop.   Pulmonary:      Effort: Pulmonary effort is normal.      Breath sounds: Normal breath sounds. No wheezing or rhonchi.   Lymphadenopathy:      Cervical: No cervical adenopathy.   Skin:     " General: Skin is warm and dry.   Neurological:      Mental Status: He is alert and oriented to person, place, and time.      Cranial Nerves: No cranial nerve deficit.   Psychiatric:         Mood and Affect: Mood and affect normal.         Behavior: Behavior normal.         Thought Content: Thought content normal. Thought content does not include homicidal or suicidal ideation.         Judgment: Judgment normal.        Result Review :   {The following data was reviewed by SKYE Turner    No Images in the past 120 days found.    Common Labs   Common labs    Common Labs 3/13/23 3/13/23 3/13/23    1233 1233 1233   Glucose  84    BUN  11    Creatinine  0.96    Sodium  141    Potassium  4.1    Chloride  103    Calcium  9.4    Albumin  4.8    Total Bilirubin  0.4    Alkaline Phosphatase  59    AST (SGOT)  27    ALT (SGPT)  14    WBC 2.80 (A)     Hemoglobin 14.7     Hematocrit 43.8     Platelets 214     Total Cholesterol   178   Triglycerides   81   HDL Cholesterol   40   LDL Cholesterol    123 (A)   (A) Abnormal value            TSH   TSH    TSH 3/13/23   TSH 1.540           VITD   Lab Results   Component Value Date    MXHP89EJ 44.7 10/18/2021     FREET4   Lab Results   Component Value Date    FREET4 1.29 03/13/2023     VITB12   Lab Results   Component Value Date    HDGDQXIB23 643 10/18/2021     UA   Lab Results   Component Value Date    RBCUA None Seen 03/13/2023    WBCUA None Seen 03/13/2023    BACTERIA None Seen 03/13/2023    SQUAMEPIUA None Seen 03/13/2023    HYALCASTU None Seen 03/13/2023    METHODOLOGY Manual Light Microscopy 03/13/2023    COLORU Yellow 03/13/2023    CLARITYU Turbid (A) 03/13/2023    PHUR 8.5 (H) 03/13/2023    SPECGRAVUR 1.024 03/13/2023    GLUCOSEU Negative 03/13/2023    KETONESU Negative 03/13/2023    BILIRUBINUR Negative 03/13/2023    BLOODU Negative 03/13/2023    PROTEINUA Trace (A) 03/13/2023    LEUKOCYTESUR Negative 03/13/2023    NITRITEU Negative 03/13/2023    UROBILINOGEN 0.2  E.U./dL 03/13/2023     URINE CULTURE     IRON    Iron   Date Value Ref Range Status   03/13/2023 166 (H) 59 - 158 mcg/dL Final     Iron Saturation   Date Value Ref Range Status   03/13/2023 39 20 - 50 % Final     Transferrin   Date Value Ref Range Status   03/13/2023 287 200 - 360 mg/dL Final     TIBC   Date Value Ref Range Status   03/13/2023 428 298 - 536 mcg/dL Final       Assessment and Plan    Diagnoses and all orders for this visit:    1. Leukopenia, unspecified type (Primary)  Assessment & Plan:  I discussed with him that I am going to recheck his CBC today and that if his WBCs are still low, I will be sending him to hematology for further evaluation.    Orders:  -     CT Abdomen Pelvis With Contrast; Future  -     CBC & Differential    2. Abdominal swelling, periumbilical region  -     CT Abdomen Pelvis With Contrast; Future    3. Foamy urine  I discussed with him due to his chronic abdominal swelling and other symptoms, I will get a CT abdomen and pelvis for further evaluation.      Follow Up   Return for will determine f/u after reviewing lab.  Patient was given instructions and counseling regarding his condition or for health maintenance advice. Please see specific information pulled into the AVS if appropriate.     Parts of this note are electronic transcriptions/translations of spoken language to printed text using the Dragon Dictation system.      Latrice Torres, APRN  03/20/2023

## 2023-03-20 NOTE — ASSESSMENT & PLAN NOTE
I discussed with him that I am going to recheck his CBC today and that if his WBCs are still low, I will be sending him to hematology for further evaluation.

## 2023-03-24 ENCOUNTER — PATIENT ROUNDING (BHMG ONLY) (OUTPATIENT)
Dept: FAMILY MEDICINE CLINIC | Facility: CLINIC | Age: 38
End: 2023-03-24
Payer: COMMERCIAL

## 2023-03-24 NOTE — PROGRESS NOTES
March 24, 2023    Hello, may I speak with Dominik Montiel?    My name is Noelle    Can't leave message due to verbal    I am  with [facility]  [address] [place] [address] [phone].    Before we get started may I verify your date of birth? 1985    I am calling to officially welcome you to our practice and ask about your recent visit. Is this a good time to talk?     Tell me about your visit with us. What things went well?         We're always looking for ways to make our patients' experiences even better. Do you have recommendations on ways we may improve?      Overall were you satisfied with your first visit to our practice?        I appreciate you taking the time to speak with me today. Is there anything else I can do for you?       Thank you, and have a great day.

## 2023-04-03 ENCOUNTER — TELEPHONE (OUTPATIENT)
Dept: FAMILY MEDICINE CLINIC | Facility: CLINIC | Age: 38
End: 2023-04-03

## 2023-04-03 NOTE — TELEPHONE ENCOUNTER
Caller: Dominik Montiel    Relationship: Self    Best call back number: [phone]    Who are you requesting to speak with (clinical staff, provider,  specific staff member): MEDICAL STAFF    What was the call regarding: PATIENT WOULD LIKE TO KNOW IF THE PROVIDER HAS FIGURED OUT IF A CT SCAN OR ULTRASOUND OF HIS ABDOMEN AND HIPS WILL BE COVERED BY INSURANCE. HE WOULD ALSO LIKE TO KNOW WHAT TO DO AS FAR AS HIS URINE RESULTS. PLEASE CALL PATIENT TO ADVISE.

## 2023-04-17 ENCOUNTER — OFFICE VISIT (OUTPATIENT)
Dept: FAMILY MEDICINE CLINIC | Facility: CLINIC | Age: 38
End: 2023-04-17
Payer: COMMERCIAL

## 2023-04-17 VITALS
DIASTOLIC BLOOD PRESSURE: 66 MMHG | BODY MASS INDEX: 21.22 KG/M2 | WEIGHT: 140 LBS | HEART RATE: 65 BPM | HEIGHT: 68 IN | TEMPERATURE: 98.1 F | OXYGEN SATURATION: 100 % | SYSTOLIC BLOOD PRESSURE: 100 MMHG

## 2023-04-17 DIAGNOSIS — H69.83 EUSTACHIAN TUBE DYSFUNCTION, BILATERAL: ICD-10-CM

## 2023-04-17 DIAGNOSIS — Z51.81 MEDICATION MONITORING ENCOUNTER: ICD-10-CM

## 2023-04-17 DIAGNOSIS — F51.01 PRIMARY INSOMNIA: Primary | ICD-10-CM

## 2023-04-17 PROBLEM — H69.93 EUSTACHIAN TUBE DYSFUNCTION, BILATERAL: Status: ACTIVE | Noted: 2023-04-17

## 2023-04-17 RX ORDER — CETIRIZINE HYDROCHLORIDE 10 MG/1
10 TABLET ORAL DAILY
Qty: 30 TABLET | Refills: 2 | Status: SHIPPED | OUTPATIENT
Start: 2023-04-17

## 2023-04-17 RX ORDER — TEMAZEPAM 7.5 MG/1
7.5 CAPSULE ORAL NIGHTLY PRN
Qty: 14 CAPSULE | Refills: 0 | Status: SHIPPED | OUTPATIENT
Start: 2023-04-17 | End: 2023-05-01

## 2023-04-17 NOTE — PROGRESS NOTES
"Chief Complaint  Insomnia    Subjective          Dominik Montiel, 37 y.o. male presents to [facility]  History of Present Illness   Patient presents today with complaints of insomnia. He states he has had problems with sleep for over 10 years. States he has tried Ambien, trazodone, Seroquel without any good relief.  He states he has taken Restoril in the past and that was the only one that seemed to help.  He does have history of bipolar, ADHD, anxiety.  He states that he does not feel he has bipolar.  He states he has not been able to sleep for a while and it is making him feel stressed.  No suicidal or homicidal ideation. He is concerned that something is wrong with his head and he is wanting to have a CT of the head.  He does not want to see psychiatry, states he has seen them before and does not want to deal with it at this time.  He states he just wants to sleep.  He is willing to have a sleep study.    He states that he has been using his [place] pot and thinks he got water in his ears.    He is scheduled for CT of the abdomen on 5/3/2023 for abdominal swelling.        Tobacco Use: Low Risk    • Smoking Tobacco Use: Never   • Smokeless Tobacco Use: Never   • Passive Exposure: Not on file      Objective   Vital Signs:   /66 (BP Location: Left arm, Patient Position: Sitting, Cuff Size: Adult)   Pulse 65   Temp 98.1 °F (36.7 °C)   Ht 172.7 cm (68\")   Wt 63.5 kg (140 lb)   SpO2 100%   BMI 21.29 kg/m²       Current Outpatient Medications:   •  cetirizine (zyrTEC) 10 MG tablet, Take 1 tablet by mouth Daily., Disp: 30 tablet, Rfl: 2  •  temazepam (Restoril) 7.5 MG capsule, Take 1 capsule by mouth At Night As Needed for Sleep for up to 14 days., Disp: 14 capsule, Rfl: 0   Past Medical History:   Diagnosis Date   • ADD (attention deficit disorder)    • Anxiety    • Irritable bowel syndrome       Physical Exam  Vitals reviewed.   Constitutional:       Appearance: Normal " appearance. He is well-developed.   HENT:      Right Ear: Ear canal and external ear normal. A middle ear effusion is present.      Left Ear: Ear canal and external ear normal. A middle ear effusion is present.   Neck:      Thyroid: No thyroid mass, thyromegaly or thyroid tenderness.   Cardiovascular:      Rate and Rhythm: Normal rate and regular rhythm.      Heart sounds: No murmur heard.    No friction rub. No gallop.   Pulmonary:      Effort: Pulmonary effort is normal.      Breath sounds: Normal breath sounds. No wheezing or rhonchi.   Lymphadenopathy:      Cervical: No cervical adenopathy.   Skin:     General: Skin is warm and dry.   Neurological:      Mental Status: He is alert and oriented to person, place, and time.      Cranial Nerves: No cranial nerve deficit.   Psychiatric:         Mood and Affect: Affect normal. Mood is anxious.         Behavior: Behavior normal.         Thought Content: Thought content normal. Thought content does not include homicidal or suicidal ideation.         Judgment: Judgment normal.        Result Review :   {The following data was reviewed by SKYE Turner      Common Labs   Common labs        3/13/2023    12:33 3/20/2023    14:50   Common Labs   Glucose 84      BUN 11      Creatinine 0.96      Sodium 141      Potassium 4.1      Chloride 103      Calcium 9.4      Albumin 4.8      Total Bilirubin 0.4      Alkaline Phosphatase 59      AST (SGOT) 27      ALT (SGPT) 14      WBC 2.80   5.17     Hemoglobin 14.7   14.2     Hematocrit 43.8   41.8     Platelets 214   205     Total Cholesterol 178      Triglycerides 81      HDL Cholesterol 40      LDL Cholesterol  123        TSH   TSH        3/13/2023    12:33   TSH   TSH 1.540              Assessment and Plan    Diagnoses and all orders for this visit:    1. Primary insomnia (Primary)  Assessment & Plan:  We discussed insomnia, I recommended that he get treated for his psychological problems but he is just wanting something  to help him sleep at this time.  He has tried and failed multiple medications for sleep.  Restoril has worked for him in the past.  Jose reviewed and is negative which is consistent.  Urine drug screen in office today is negative which is consistent.  Narcotic contract discussed and signed today.  I will give him a temporary prescription for Restoril while I get him referred to the sleep center.    Orders:  -     Ambulatory Referral to Sleep Medicine  -     temazepam (Restoril) 7.5 MG capsule; Take 1 capsule by mouth At Night As Needed for Sleep for up to 14 days.  Dispense: 14 capsule; Refill: 0    2. Eustachian tube dysfunction, bilateral  Assessment & Plan:  I discussed with him about eustachian tube dysfunction.  I will start him on Zyrtec.    Orders:  -     cetirizine (zyrTEC) 10 MG tablet; Take 1 tablet by mouth Daily.  Dispense: 30 tablet; Refill: 2    3. Medication monitoring encounter  -     POC Urine Drug Screen [facility]      Follow Up   Return if symptoms worsen or fail to improve.  Patient was given instructions and counseling regarding his condition or for health maintenance advice. Please see specific information pulled into the AVS if appropriate.     Parts of this note are electronic transcriptions/translations of spoken language to printed text using the Dragon Dictation system.      Latrice Torres, APRN  04/17/2023

## 2023-04-17 NOTE — ASSESSMENT & PLAN NOTE
We discussed insomnia, I recommended that he get treated for his psychological problems but he is just wanting something to help him sleep at this time.  He has tried and failed multiple medications for sleep.  Restoril has worked for him in the past.  Jose reviewed and is negative which is consistent.  Urine drug screen in office today is negative which is consistent.  Narcotic contract discussed and signed today.  I will give him a temporary prescription for Restoril while I get him referred to the sleep center.

## 2023-04-24 ENCOUNTER — TELEPHONE (OUTPATIENT)
Dept: FAMILY MEDICINE CLINIC | Facility: CLINIC | Age: 38
End: 2023-04-24
Payer: COMMERCIAL

## 2023-04-24 NOTE — TELEPHONE ENCOUNTER
Temazepam is Restoril which is what he told me worked for him in the past.  I did give him a low dose so he is able to take 2 of those tonight to see if that works.  He can let me know tomorrow.

## 2023-04-24 NOTE — TELEPHONE ENCOUNTER
Patient said he thru aware temazepam since it was not working for him. He does not have any. He is asking if he could have something else that is stronger.

## 2023-04-24 NOTE — TELEPHONE ENCOUNTER
He should not have thrown that prescription away without discussing that with me first, that is a controlled substance.  The only thing I will give him at this point is some Seroquel which will help him sleep.

## 2023-04-24 NOTE — TELEPHONE ENCOUNTER
Patient phoned office said sleeping medication temazepam is not strong enough not helping or he needs another medication. He is not able to sleep at all.   Benjamin Thayer Dr.

## 2023-04-26 ENCOUNTER — TELEPHONE (OUTPATIENT)
Dept: FAMILY MEDICINE CLINIC | Facility: CLINIC | Age: 38
End: 2023-04-26
Payer: COMMERCIAL

## 2023-04-26 NOTE — TELEPHONE ENCOUNTER
What was the call regarding: PATIENT STATES HIS MOTHER IS GOING TO PICK HIM UP AND THEY WILL DISCUSS WHAT TO DO. PATIENT STATES HE MAY NOT GO TO THE ER.

## 2023-04-26 NOTE — TELEPHONE ENCOUNTER
Patient phoned the office states I ate some gummies last night and I have had an out of body experience. States I have not slept for 3 days and I am having halualations and out of body experience. Patient states he is in [place] and and can not drive to ER, I advise patient to call 911 and tell EMS the same thing he just told me and they will pick him up to got to ER. Patient verbalized understanding.

## 2023-05-01 DIAGNOSIS — F51.01 PRIMARY INSOMNIA: ICD-10-CM

## 2023-05-01 RX ORDER — TEMAZEPAM 7.5 MG/1
7.5 CAPSULE ORAL NIGHTLY PRN
Qty: 14 CAPSULE | Refills: 0 | Status: CANCELLED | OUTPATIENT
Start: 2023-05-01 | End: 2023-05-15

## 2023-05-01 NOTE — PROGRESS NOTES
"Answers for HPI/ROS submitted by the patient on 5/1/2023  What is the primary reason for your visit?: Other  Please describe your symptoms.: Not sleeping- Tension headache around head everyday, high blood pressure noticed back of head around neck, high stress and anxiety, weak and no energy to do anything, unaware whats going on in my head feeling of being drunk and intoxicated, irritable, vernon, can not think or function just talking words, very frustrated with how i feel and makes each day so so hard to get through  Have you had these symptoms before?: Yes  How long have you been having these symptoms?: Greater than 2 weeks  Please list any medications you are currently taking for this condition.: None  Please describe any probable cause for these symptoms. : Insomnia    Chief Complaint  Insomnia    Subjective          Dominik Montiel, 37 y.o. male presents to [facility]  History of Present Illness   Patient presents today with complaints of not being able to sleep, chronic daily headaches, feeling highly stressed and anxious, complaining no injury to do anything, a feeling of being intoxicated, irritable, vernon, states cannot think or function.  He has underlying history of bipolar and ADHD.  He has no thoughts of hurting himself or others.  On his last visit he wanted to be prescribed a sleeping pill and had requested Restoril and did not want to be treated for bipolar at that time.  I gave him a low-dose of Restoril 7.5 mg.  He had called back stating that it did not work so he threw away the pills.  I discussed with him that he cannot just throw away controlled medications.     Tobacco Use: Low Risk    • Smoking Tobacco Use: Never   • Smokeless Tobacco Use: Never   • Passive Exposure: Not on file      Objective   Vital Signs:   /76   Pulse 64   Temp 98.5 °F (36.9 °C)   Ht 172.7 cm (68\")   Wt 63 kg (139 lb)   SpO2 100%   BMI 21.13 kg/m²       Current Outpatient " Medications:   •  Cariprazine HCl (Vraylar) 1.5 MG capsule capsule, Take 1 capsule by mouth Daily., Disp: 4 capsule, Rfl: 0  •  Cariprazine HCl (Vraylar) 3 MG capsule capsule, Take 1 capsule by mouth Daily., Disp: 30 capsule, Rfl: 0  •  cetirizine (zyrTEC) 10 MG tablet, Take 1 tablet by mouth Daily. (Patient not taking: Reported on 5/2/2023), Disp: 30 tablet, Rfl: 2  •  QUEtiapine (SEROquel) 50 MG tablet, Take 1 tablet by mouth Every Night., Disp: 14 tablet, Rfl: 0   Past Medical History:   Diagnosis Date   • ADD (attention deficit disorder)    • Anxiety    • Irritable bowel syndrome       Physical Exam  Vitals reviewed.   Constitutional:       Appearance: Normal appearance. He is well-developed.   Neck:      Thyroid: No thyroid mass, thyromegaly or thyroid tenderness.   Cardiovascular:      Rate and Rhythm: Normal rate and regular rhythm.      Heart sounds: No murmur heard.    No friction rub. No gallop.   Pulmonary:      Effort: Pulmonary effort is normal.      Breath sounds: Normal breath sounds. No wheezing or rhonchi.   Lymphadenopathy:      Cervical: No cervical adenopathy.   Skin:     General: Skin is warm and dry.   Neurological:      Mental Status: He is alert and oriented to person, place, and time.      Cranial Nerves: No cranial nerve deficit.   Psychiatric:         Mood and Affect: Affect normal. Mood is anxious.         Speech: Speech is rapid and pressured.         Behavior: Behavior normal.         Thought Content: Thought content normal. Thought content does not include homicidal or suicidal ideation.         Judgment: Judgment normal.        Result Review :   {The following data was reviewed by SKYE Turner    No Images in the past 120 days found..    Common Labs   Common labs        3/13/2023    12:33 3/20/2023    14:50   Common Labs   Glucose 84      BUN 11      Creatinine 0.96      Sodium 141      Potassium 4.1      Chloride 103      Calcium 9.4      Albumin 4.8      Total Bilirubin  0.4      Alkaline Phosphatase 59      AST (SGOT) 27      ALT (SGPT) 14      WBC 2.80   5.17     Hemoglobin 14.7   14.2     Hematocrit 43.8   41.8     Platelets 214   205     Total Cholesterol 178      Triglycerides 81      HDL Cholesterol 40      LDL Cholesterol  123        TSH   TSH        3/13/2023    12:33   TSH   TSH 1.540       FREET4   Lab Results   Component Value Date    FREET4 1.29 03/13/2023             Assessment and Plan    Diagnoses and all orders for this visit:    1. Bipolar affective disorder, currently manic, moderate (Primary)  Assessment & Plan:  Psychological condition is newly identified.  Referral to psychiatry.  I will get him started on Vraylar, will give him a sample of Vraylar 1.5 mg, advised to take 1 tablet today and then increase to 2 tablets daily until he finishes this pack.  I then sent in a 3 mg dose of Vraylar to the pharmacy.  We discussed drug efficacy and potential side effects.  Psychological condition  will be reassessed in 2 weeks.    Orders:  -     Cariprazine HCl (Vraylar) 1.5 MG capsule capsule; Take 1 capsule by mouth Daily.  Dispense: 4 capsule; Refill: 0  -     Cariprazine HCl (Vraylar) 3 MG capsule capsule; Take 1 capsule by mouth Daily.  Dispense: 30 capsule; Refill: 0  -     Ambulatory Referral to Psychiatry    2. Psychophysiological insomnia  Assessment & Plan:  Patient is not sleeping at all.  I am going to start him on some Seroquel 50 mg nightly to get him some sleep while we are getting his Vraylar started.  We may need to discontinue Seroquel when regular dose increases in the future.  I am also getting him referred to psychiatry.    Orders:  -     QUEtiapine (SEROquel) 50 MG tablet; Take 1 tablet by mouth Every Night.  Dispense: 14 tablet; Refill: 0  -     Ambulatory Referral to Psychiatry    3. Anxiety  Assessment & Plan:  We discussed his anxiety and we may add on some anxiety medicine but we will just start with Vraylar and Seroquel at this  time.        Follow Up   Return in about 2 weeks (around 5/16/2023) for Next scheduled follow up bipolar, anxiety.  Patient was given instructions and counseling regarding his condition or for health maintenance advice. Please see specific information pulled into the AVS if appropriate.     Parts of this note are electronic transcriptions/translations of spoken language to printed text using the Dragon Dictation system.      Latrice Torres, SKYE  05/02/2023

## 2023-05-01 NOTE — TELEPHONE ENCOUNTER
PATIENT CALLED BACK AND STATED THAT HE WOULD LIKE TO GO AHEAD AND HAVE THE REORDER OF THE TEMAZEPAM  AND PLEASE SEND TO [facility]  [address]

## 2023-05-02 ENCOUNTER — TELEPHONE (OUTPATIENT)
Dept: FAMILY MEDICINE CLINIC | Facility: CLINIC | Age: 38
End: 2023-05-02

## 2023-05-02 ENCOUNTER — OFFICE VISIT (OUTPATIENT)
Dept: FAMILY MEDICINE CLINIC | Facility: CLINIC | Age: 38
End: 2023-05-02
Payer: COMMERCIAL

## 2023-05-02 VITALS
HEIGHT: 68 IN | HEART RATE: 64 BPM | DIASTOLIC BLOOD PRESSURE: 76 MMHG | BODY MASS INDEX: 21.07 KG/M2 | SYSTOLIC BLOOD PRESSURE: 120 MMHG | WEIGHT: 139 LBS | TEMPERATURE: 98.5 F | OXYGEN SATURATION: 100 %

## 2023-05-02 DIAGNOSIS — F51.04 PSYCHOPHYSIOLOGICAL INSOMNIA: ICD-10-CM

## 2023-05-02 DIAGNOSIS — F31.12 BIPOLAR AFFECTIVE DISORDER, CURRENTLY MANIC, MODERATE: Primary | ICD-10-CM

## 2023-05-02 DIAGNOSIS — F41.9 ANXIETY: ICD-10-CM

## 2023-05-02 PROCEDURE — 99214 OFFICE O/P EST MOD 30 MIN: CPT | Performed by: NURSE PRACTITIONER

## 2023-05-02 PROCEDURE — 1160F RVW MEDS BY RX/DR IN RCRD: CPT | Performed by: NURSE PRACTITIONER

## 2023-05-02 PROCEDURE — 1159F MED LIST DOCD IN RCRD: CPT | Performed by: NURSE PRACTITIONER

## 2023-05-02 RX ORDER — QUETIAPINE FUMARATE 50 MG/1
50 TABLET, FILM COATED ORAL NIGHTLY
Qty: 14 TABLET | Refills: 0 | Status: SHIPPED | OUTPATIENT
Start: 2023-05-02

## 2023-05-02 NOTE — ASSESSMENT & PLAN NOTE
We discussed his anxiety and we may add on some anxiety medicine but we will just start with Vraylar and Seroquel at this time.

## 2023-05-02 NOTE — ASSESSMENT & PLAN NOTE
Patient is not sleeping at all.  I am going to start him on some Seroquel 50 mg nightly to get him some sleep while we are getting his Vraylar started.  We may need to discontinue Seroquel when regular dose increases in the future.  I am also getting him referred to psychiatry.

## 2023-05-02 NOTE — ASSESSMENT & PLAN NOTE
Psychological condition is newly identified.  Referral to psychiatry.  I will get him started on Vraylar, will give him a sample of Vraylar 1.5 mg, advised to take 1 tablet today and then increase to 2 tablets daily until he finishes this pack.  I then sent in a 3 mg dose of Vraylar to the pharmacy.  We discussed drug efficacy and potential side effects.  Psychological condition  will be reassessed in 2 weeks.

## 2023-05-03 ENCOUNTER — TELEPHONE (OUTPATIENT)
Dept: FAMILY MEDICINE CLINIC | Facility: CLINIC | Age: 38
End: 2023-05-03
Payer: COMMERCIAL

## 2023-05-03 ENCOUNTER — HOSPITAL ENCOUNTER (OUTPATIENT)
Dept: CT IMAGING | Facility: HOSPITAL | Age: 38
Discharge: HOME | End: 2023-05-03
Admitting: NURSE PRACTITIONER
Payer: COMMERCIAL

## 2023-05-03 DIAGNOSIS — R19.05 ABDOMINAL SWELLING, PERIUMBILICAL REGION: ICD-10-CM

## 2023-05-03 DIAGNOSIS — D72.819 LEUKOPENIA, UNSPECIFIED TYPE: ICD-10-CM

## 2023-05-03 PROCEDURE — 74177 CT ABD & PELVIS W/CONTRAST: CPT

## 2023-05-03 PROCEDURE — 25510000001 IOPAMIDOL PER 1 ML: Performed by: NURSE PRACTITIONER

## 2023-05-03 RX ADMIN — IOPAMIDOL 100 ML: 755 INJECTION, SOLUTION INTRAVENOUS at 12:53

## 2023-05-03 NOTE — TELEPHONE ENCOUNTER
Patient phoned office said he took Vraylar sample yesterday around 4pm.He is now having watering eyes, jaw pain and general pain. Patient said this medication did not make him sleep. He was unable to get seroquel due to needing  prior authorization.

## 2023-05-04 NOTE — TELEPHONE ENCOUNTER
Tell him to stop Vraylar due to side effects. Please check on the status of seroquel, if it needs a PA?

## 2023-05-10 ENCOUNTER — TELEPHONE (OUTPATIENT)
Dept: FAMILY MEDICINE CLINIC | Facility: CLINIC | Age: 38
End: 2023-05-10
Payer: COMMERCIAL

## 2023-05-10 NOTE — TELEPHONE ENCOUNTER
Patient had left a message with Laurita for me to return a call. I got his voicemail and left a message to return a call to me. The message had said call about sleep meds.

## 2023-05-10 NOTE — TELEPHONE ENCOUNTER
I talked with patient about Seroquel and I called Benjamin. The RX is available and ready to picked up.

## 2023-05-11 ENCOUNTER — TELEPHONE (OUTPATIENT)
Dept: FAMILY MEDICINE CLINIC | Facility: CLINIC | Age: 38
End: 2023-05-11
Payer: COMMERCIAL

## 2023-05-11 NOTE — TELEPHONE ENCOUNTER
Tell him to cut the tablet in half and only take a half a tablet at night.  Those symptoms will improve after he has been on it for little bit.

## 2023-05-11 NOTE — TELEPHONE ENCOUNTER
Pt called stating that he took the seroquel last night and today he feels hungover, zombie like, and tired today. He doesn't want to take something that makes him feel that way. Please advise    He's still not sleeping.

## 2023-05-11 NOTE — TELEPHONE ENCOUNTER
Pt is not willing to take Rx. Per PCP we need to find out why Pts has not been scheduled and try to get him to see psych tomorrow or early next week

## 2023-05-11 NOTE — TELEPHONE ENCOUNTER
Pt does not want to try to go down to half a tablet. He believes once his mental health is better his physical health will be better. He still has not heard from psych to schedule apt.

## 2023-05-12 NOTE — TELEPHONE ENCOUNTER
Ping at Dr. Watt's office said she tried to call him yesterday to scheduled and he didn't answer but she was going keep trying    Hirsch catheter with 1300mL purulent, malodorous, yellow urine with sediment

## 2023-05-15 NOTE — TELEPHONE ENCOUNTER
Can we get patient scheduled today in office or telehealth to discuss medication options with Latrice

## 2023-07-27 NOTE — TELEPHONE ENCOUNTER
Caller: Dominik Montiel A    Relationship: Self    Best call back number: [phone]       What was the call regarding:     THE PATIENT SAID A PA IS NEEDED FOR   QUEtiapine (SEROquel) 50 MG tablet  Cariprazine HCl (Vraylar) 1.5 MG capsule capsule              [facility] [id number] [facility] [place] [facility] [address] AT [facility] [address] - [phone]  - [phone]   [phone]      THE PATIENT WOULD LIKE A CALL TO BE UPDATED ON THE PA  
HUB TO READ GIVEN TO PATIENT AND HE EXPRESSED UNDERSTANDING. PATIENT STATES HE WOULD LIKE TO KNOW IF HE SHOULD START TAKING VRAYLAR NOW OR WAIT FOR THE PA APPROVAL.    PHONE NUMBER:  [phone]  
Hub to read:    PA has been completed and sent to [employer]. We are waiting for [employer] approval or denial. Once we have heard back from [employer] we will let patient know  
LVM that PA has been approved   
.

## 2024-07-06 ENCOUNTER — HOSPITAL ENCOUNTER (EMERGENCY)
Facility: HOSPITAL | Age: 39
Discharge: HOME | End: 2024-07-06
Attending: EMERGENCY MEDICINE
Payer: MEDICAID

## 2024-07-06 VITALS
BODY MASS INDEX: 22.13 KG/M2 | OXYGEN SATURATION: 99 % | WEIGHT: 146 LBS | TEMPERATURE: 98.1 F | SYSTOLIC BLOOD PRESSURE: 117 MMHG | DIASTOLIC BLOOD PRESSURE: 72 MMHG | HEIGHT: 68 IN | RESPIRATION RATE: 18 BRPM | HEART RATE: 70 BPM

## 2024-07-06 DIAGNOSIS — F31.9 BIPOLAR AFFECTIVE DISORDER, REMISSION STATUS UNSPECIFIED: ICD-10-CM

## 2024-07-06 DIAGNOSIS — G47.00 INSOMNIA, UNSPECIFIED TYPE: ICD-10-CM

## 2024-07-06 DIAGNOSIS — F32.A DEPRESSION, UNSPECIFIED DEPRESSION TYPE: Primary | ICD-10-CM

## 2024-07-06 PROCEDURE — 99282 EMERGENCY DEPT VISIT SF MDM: CPT

## 2024-07-06 RX ORDER — BUPROPION HYDROCHLORIDE 150 MG/1
150 TABLET ORAL DAILY
Qty: 30 TABLET | Refills: 0 | Status: SHIPPED | OUTPATIENT
Start: 2024-07-06

## 2024-07-06 RX ORDER — OLANZAPINE 5 MG/1
5 TABLET ORAL NIGHTLY
Qty: 30 TABLET | Refills: 0 | Status: SHIPPED | OUTPATIENT
Start: 2024-07-06

## 2024-07-06 RX ORDER — TRAZODONE HYDROCHLORIDE 100 MG/1
100 TABLET ORAL NIGHTLY
Qty: 30 TABLET | Refills: 0 | Status: SHIPPED | OUTPATIENT
Start: 2024-07-06

## 2024-07-06 NOTE — ED PROVIDER NOTES
Time: 3:48 PM EDT  Date of encounter:  7/6/2024  Independent Historian/Clinical History and Information was obtained by:   Patient    History is limited by: N/A    Chief Complaint: Depression      History of Present Illness:  Patient is a 38 y.o. year old male who presents to the emergency department for psychiatric evaluation.  Patient states he has been off his medication for depression a DMDD since March.  He has lost his job and has not slept in the month.  States he is also developed a gambling problem.  Patient denies alcohol or drug use.  He denies suicidal/homicidal ideation.    HPI    Patient Care Team  Primary Care Provider: Latrice Torres APRN    Past Medical History:     No Known Allergies  Past Medical History:   Diagnosis Date    ADD (attention deficit disorder)     Anxiety     Depression     Irritable bowel syndrome      Past Surgical History:   Procedure Laterality Date    COLONOSCOPY       Family History   Problem Relation Age of Onset    Arthritis Mother     Diabetes Father     Arthritis Father     Hearing loss Maternal Grandfather        Home Medications:  Prior to Admission medications    Medication Sig Start Date End Date Taking? Authorizing Provider   Cariprazine HCl (Vraylar) 1.5 MG capsule capsule Take 1 capsule by mouth Daily. 5/2/23   Latrice Torres APRN   Cariprazine HCl (Vraylar) 3 MG capsule capsule Take 1 capsule by mouth Daily. 5/2/23   Latrice Torres APRN   cetirizine (zyrTEC) 10 MG tablet Take 1 tablet by mouth Daily.  Patient not taking: Reported on 5/2/2023 4/17/23   Latrice Torres APRN   QUEtiapine (SEROquel) 50 MG tablet Take 1 tablet by mouth Every Night. 5/2/23   Latrice Torres APRN        Social History:   Social History     Tobacco Use    Smoking status: Never    Smokeless tobacco: Never   Vaping Use    Vaping status: Never Used   Substance Use Topics    Alcohol use: Never    Drug use: Never         Review of Systems:  Review of Systems  "  Psychiatric/Behavioral:  Positive for dysphoric mood and sleep disturbance.         Physical Exam:  /72 (BP Location: Left arm, Patient Position: Sitting)   Pulse 70   Temp 98.1 °F (36.7 °C) (Oral)   Resp 18   Ht 172.7 cm (68\")   Wt 66.2 kg (146 lb)   SpO2 99%   BMI 22.20 kg/m²     Physical Exam  Vitals and nursing note reviewed.   Constitutional:       General: He is not in acute distress.  Cardiovascular:      Rate and Rhythm: Normal rate and regular rhythm.      Heart sounds: Normal heart sounds.   Pulmonary:      Effort: Pulmonary effort is normal. No respiratory distress.      Breath sounds: Normal breath sounds.   Abdominal:      Palpations: Abdomen is soft.      Tenderness: There is no abdominal tenderness.   Musculoskeletal:         General: Normal range of motion.      Cervical back: Normal range of motion.   Skin:     General: Skin is warm and dry.   Neurological:      Mental Status: He is alert and oriented to person, place, and time.   Psychiatric:         Mood and Affect: Mood normal.                  Procedures:  Procedures      Medical Decision Making:      Comorbidities that affect care:    Depression, ADD    External Notes reviewed:    Previous Clinic Note: Primary care provider note on 5/2/2023 by nurse practitioner for complaint of insomnia.  Patient has listed as diagnosis bipolar affective disorder      The following orders were placed and all results were independently analyzed by me:  No orders of the defined types were placed in this encounter.      Medications Given in the Emergency Department:  Medications - No data to display     ED Course:         Labs:    Lab Results (last 24 hours)       ** No results found for the last 24 hours. **             Imaging:    No Radiology Exams Resulted Within Past 24 Hours      Differential Diagnosis and Discussion:    Psychiatric: Differential diagnosis includes but is not limited to depression, psychosis, bipolar disorder, anxiety, manic " episode, schizophrenia, and substance abuse.    MDM  Patient has been out of his depression medications for a while.  He has history of insomnia.  Patient also apparently has history of bipolar affective disorder.  Refills for his last medications was provided along with trazodone to help with sleep.  Emergency department  talk to the patient and will make arrangements for follow-up with [facility] to review medications and make adjustments and also with financial services to aid the patient seeing if he qualifies for passport Medicaid.    Patient is not suicidal/homicidal and meets no inpatient criteria for psychiatric admission.      Patient Care Considerations:        Consultants/Shared Management Plan:  Patient discussed with the emergency department     Social Determinants of Health:    Patient is independent, reliable, and has access to care.       Disposition and Care Coordination:    Discharged: The patient is suitable and stable for discharge with no need for consideration of admission.    I have explained the patient´s condition, diagnoses and treatment plan based on the information available to me at this time. I have answered questions and addressed any concerns. The patient has a good  understanding of the patient´s diagnosis, condition, and treatment plan as can be expected at this point. The vital signs have been stable. The patient´s condition is stable and appropriate for discharge from the emergency department.      The patient will pursue further outpatient evaluation with the primary care physician or other designated or consulting physician as outlined in the discharge instructions. They are agreeable to this plan of care and follow-up instructions have been explained in detail. The patient has received these instructions in written format and has expressed an understanding of the discharge instructions. The patient is aware that any significant change in  condition or worsening of symptoms should prompt an immediate return to this or the closest emergency department or call to 911.  I have explained discharge medications and the need for follow up with the patient/caretakers. This was also printed in the discharge instructions. Patient was discharged with the following medications and follow up:      Medication List        New Prescriptions      buPROPion  MG 24 hr tablet  Commonly known as: Wellbutrin XL  Take 1 tablet by mouth Daily.     OLANZapine 5 MG tablet  Commonly known as: zyPREXA  Take 1 tablet by mouth Every Night.     traZODone 100 MG tablet  Commonly known as: DESYREL  Take 1 tablet by mouth Every Night.               Where to Get Your Medications        These medications were sent to [facility] [id number] - [place], [place] - [address] [facility] [address] - [phone]  - [phone] FX  [address], [place] [address]      Phone: [phone]   buPROPion  MG 24 hr tablet  OLANZapine 5 MG tablet  traZODone 100 MG tablet      No follow-up provider specified.     Final diagnoses:   Depression, unspecified depression type   Insomnia, unspecified type        ED Disposition       ED Disposition   Discharge    Condition   Stable    Comment   --               This medical record created using voice recognition software.

## 2024-08-01 ENCOUNTER — HOSPITAL ENCOUNTER (EMERGENCY)
Facility: HOSPITAL | Age: 39
Discharge: HOME | End: 2024-08-01
Attending: EMERGENCY MEDICINE
Payer: COMMERCIAL

## 2024-08-01 VITALS
TEMPERATURE: 99.5 F | OXYGEN SATURATION: 95 % | DIASTOLIC BLOOD PRESSURE: 73 MMHG | WEIGHT: 145.94 LBS | HEIGHT: 67 IN | HEART RATE: 76 BPM | SYSTOLIC BLOOD PRESSURE: 114 MMHG | RESPIRATION RATE: 18 BRPM | BODY MASS INDEX: 22.91 KG/M2

## 2024-08-01 DIAGNOSIS — Z76.0 ENCOUNTER FOR MEDICATION REFILL: Primary | ICD-10-CM

## 2024-08-01 PROCEDURE — 99282 EMERGENCY DEPT VISIT SF MDM: CPT

## 2024-08-01 RX ORDER — OLANZAPINE 5 MG/1
5 TABLET ORAL NIGHTLY
Qty: 15 TABLET | Refills: 0 | Status: SHIPPED | OUTPATIENT
Start: 2024-08-01 | End: 2024-08-16

## 2024-08-01 RX ORDER — BUPROPION HYDROCHLORIDE 150 MG/1
150 TABLET ORAL DAILY
Qty: 15 TABLET | Refills: 0 | Status: SHIPPED | OUTPATIENT
Start: 2024-08-01 | End: 2024-08-16

## 2024-08-01 NOTE — ED PROVIDER NOTES
Time: 7:34 PM EDT  Date of encounter:  8/1/2024  Independent Historian/Clinical History and Information was obtained by:   Patient    History is limited by: N/A    Chief Complaint: Medication refill      History of Present Illness:  Patient is a 38 y.o. year old male who presents to the emergency department for evaluation of needing medication refills.  Patient reports he has 5 days left of his depression medications before he runs out.  Patient reports he only found out today that he has been assigned a primary care provider but his insurance company.  Patient reports he plans to call tomorrow to schedule appointment with his PCP for further management medications.  Patient denies any changes in depression or anxiety since beginning medications 3 weeks ago.  Patient denies any suicidal ideation or homicidal ideation at this time.  Reports sleep disturbances that have been unhelped by medications that he was tried in the past.    HPI    Patient Care Team  Primary Care Provider: Provider, No Known    Past Medical History:     No Known Allergies  Past Medical History:   Diagnosis Date    ADD (attention deficit disorder)     Anxiety     Depression     Irritable bowel syndrome      Past Surgical History:   Procedure Laterality Date    COLONOSCOPY       Family History   Problem Relation Age of Onset    Arthritis Mother     Diabetes Father     Arthritis Father     Hearing loss Maternal Grandfather        Home Medications:  Prior to Admission medications    Medication Sig Start Date End Date Taking? Authorizing Provider   buPROPion XL (Wellbutrin XL) 150 MG 24 hr tablet Take 1 tablet by mouth Daily for 15 days. 8/1/24 8/16/24  Susanne Dey APRN   Cariprazine HCl (Vraylar) 1.5 MG capsule capsule Take 1 capsule by mouth Daily. 5/2/23   Latrice Torres APRN   Cariprazine HCl (Vraylar) 3 MG capsule capsule Take 1 capsule by mouth Daily. 5/2/23   Latrice Torres APRN   cetirizine (zyrTEC) 10 MG tablet Take 1  "tablet by mouth Daily.  Patient not taking: Reported on 5/2/2023 4/17/23   Latrice Torres APRN   OLANZapine (zyPREXA) 5 MG tablet Take 1 tablet by mouth Every Night for 15 days. 8/1/24 8/16/24  Susanne Dey APRN   QUEtiapine (SEROquel) 50 MG tablet Take 1 tablet by mouth Every Night. 5/2/23   Latrice Torres APRN   traZODone (DESYREL) 100 MG tablet Take 1 tablet by mouth Every Night. 7/6/24   Esteban Hester DO   buPROPion XL (Wellbutrin XL) 150 MG 24 hr tablet Take 1 tablet by mouth Daily. 7/6/24 8/1/24  Esteban Hester DO   OLANZapine (zyPREXA) 5 MG tablet Take 1 tablet by mouth Every Night. 7/6/24 8/1/24  Esteban Hester DO        Social History:   Social History     Tobacco Use    Smoking status: Never    Smokeless tobacco: Never   Vaping Use    Vaping status: Never Used   Substance Use Topics    Alcohol use: Never    Drug use: Never         Review of Systems:  Review of Systems   Constitutional:  Negative for chills, fatigue and fever.   HENT:  Negative for ear pain, rhinorrhea and sore throat.    Eyes:  Negative for visual disturbance.   Respiratory:  Negative for cough and shortness of breath.    Cardiovascular:  Negative for chest pain.   Gastrointestinal:  Negative for abdominal pain, diarrhea and vomiting.   Genitourinary:  Negative for difficulty urinating.   Musculoskeletal:  Negative for arthralgias, back pain and myalgias.   Skin:  Negative for rash.   Neurological:  Negative for light-headedness and headaches.   Hematological:  Negative for adenopathy.   Psychiatric/Behavioral: Negative.          Physical Exam:  /73 (BP Location: Right arm, Patient Position: Sitting)   Pulse 76   Temp 99.5 °F (37.5 °C) (Oral)   Resp 18   Ht 170.2 cm (67\")   Wt 66.2 kg (145 lb 15.1 oz)   SpO2 95%   BMI 22.86 kg/m²     Physical Exam  Vitals and nursing note reviewed.   Constitutional:       General: He is not in acute distress.     Appearance: Normal appearance. He is not toxic-appearing. "   HENT:      Head: Normocephalic and atraumatic.      Nose: Nose normal.      Mouth/Throat:      Mouth: Mucous membranes are moist.   Eyes:      Conjunctiva/sclera: Conjunctivae normal.   Cardiovascular:      Rate and Rhythm: Normal rate.      Pulses: Normal pulses.   Pulmonary:      Effort: Pulmonary effort is normal.   Musculoskeletal:         General: Normal range of motion.      Cervical back: Normal range of motion.   Skin:     General: Skin is warm and dry.   Neurological:      General: No focal deficit present.      Mental Status: He is alert and oriented to person, place, and time.   Psychiatric:         Mood and Affect: Mood normal.         Behavior: Behavior normal.         Thought Content: Thought content normal.         Judgment: Judgment normal.                  Procedures:  Procedures      Medical Decision Making:      Comorbidities that affect care:    Depression, anxiety    External Notes reviewed:    None      The following orders were placed and all results were independently analyzed by me:  No orders of the defined types were placed in this encounter.      Medications Given in the Emergency Department:  Medications - No data to display     ED Course:         Labs:    Lab Results (last 24 hours)       ** No results found for the last 24 hours. **             Imaging:    No Radiology Exams Resulted Within Past 24 Hours      Differential Diagnosis and Discussion:    Psychiatric: Differential diagnosis includes but is not limited to depression, psychosis, bipolar disorder, anxiety, manic episode, schizophrenia, and substance abuse.        MDM  Number of Diagnoses or Management Options  Encounter for medication refill  Diagnosis management comments: I have explained the patient´s condition, diagnoses and treatment plan based on the information available to me at this time. I have answered questions and addressed any concerns. The patient has a good  understanding of the patient´s diagnosis, condition,  and treatment plan as can be expected at this point. The vital signs have been stable. The patient´s condition is stable and appropriate for discharge from the emergency department.      The patient will pursue further outpatient evaluation with the primary care physician or other designated or consulting physician as outlined in the discharge instructions. They are agreeable to this plan of care and follow-up instructions have been explained in detail. The patient has received these instructions in written format and has expressed an understanding of the discharge instructions. The patient is aware that any significant change in condition or worsening of symptoms should prompt an immediate return to this or the closest emergency department or call to 911.               Patient Care Considerations:    LABS: I considered ordering labs, however patient has no complaints and reports he is only here for medication refills.      Consultants/Shared Management Plan:    None    Social Determinants of Health:    Patient is independent, reliable, and has access to care.       Disposition and Care Coordination:    Discharged: The patient is suitable and stable for discharge with no need for consideration of admission.    I have explained the patient´s condition, diagnoses and treatment plan based on the information available to me at this time. I have answered questions and addressed any concerns. The patient has a good  understanding of the patient´s diagnosis, condition, and treatment plan as can be expected at this point. The vital signs have been stable. The patient´s condition is stable and appropriate for discharge from the emergency department.      The patient will pursue further outpatient evaluation with the primary care physician or other designated or consulting physician as outlined in the discharge instructions. They are agreeable to this plan of care and follow-up instructions have been explained in detail. The  patient has received these instructions in written format and has expressed an understanding of the discharge instructions. The patient is aware that any significant change in condition or worsening of symptoms should prompt an immediate return to this or the closest emergency department or call to 911.  I have explained discharge medications and the need for follow up with the patient/caretakers. This was also printed in the discharge instructions. Patient was discharged with the following medications and follow up:      Where to Get Your Medications        These medications were sent to [facility] [id number] - [place] - [address] AT [facility] [address] - [phone] [facility] [phone] FX  [address], [place] [address]      Phone: [phone]   buPROPion  MG 24 hr tablet  OLANZapine 5 MG tablet          Medication List      No changes were made to your prescriptions during this visit.      Provider, No Known  [facility]  [place] [address]    Go to   As needed       Final diagnoses:   Encounter for medication refill        ED Disposition       ED Disposition   Discharge    Condition   Stable    Comment   --               This medical record created using voice recognition software.             Susanne Dey, APRN  08/01/24 1658

## 2024-08-01 NOTE — DISCHARGE INSTRUCTIONS
Please call schedule appointment with your primary care provider as soon as possible.  You have been prescribed 15 days of the medication so you will have time to get in to see your primary care provider for further medication management.  Return to the ED for chest pain, shortness of breath, or any developing symptoms of concern.

## 2024-08-12 ENCOUNTER — OFFICE VISIT (OUTPATIENT)
Dept: INTERNAL MEDICINE | Age: 39
End: 2024-08-12
Payer: COMMERCIAL

## 2024-08-12 ENCOUNTER — TELEPHONE (OUTPATIENT)
Dept: INTERNAL MEDICINE | Age: 39
End: 2024-08-12

## 2024-08-12 VITALS
TEMPERATURE: 97.9 F | WEIGHT: 154.3 LBS | OXYGEN SATURATION: 98 % | SYSTOLIC BLOOD PRESSURE: 121 MMHG | BODY MASS INDEX: 24.22 KG/M2 | DIASTOLIC BLOOD PRESSURE: 80 MMHG | HEART RATE: 58 BPM | HEIGHT: 67 IN

## 2024-08-12 DIAGNOSIS — E55.9 VITAMIN D DEFICIENCY: ICD-10-CM

## 2024-08-12 DIAGNOSIS — Z86.2 HISTORY OF ANEMIA: ICD-10-CM

## 2024-08-12 DIAGNOSIS — E53.8 VITAMIN B12 DEFICIENCY: ICD-10-CM

## 2024-08-12 DIAGNOSIS — Z00.00 ANNUAL PHYSICAL EXAM: ICD-10-CM

## 2024-08-12 DIAGNOSIS — F39 MOOD DISORDER: Primary | ICD-10-CM

## 2024-08-12 DIAGNOSIS — F90.9 ATTENTION DEFICIT HYPERACTIVITY DISORDER (ADHD), UNSPECIFIED ADHD TYPE: Chronic | ICD-10-CM

## 2024-08-12 LAB
25(OH)D3 SERPL-MCNC: 37.6 NG/ML (ref 30–100)
ALBUMIN SERPL-MCNC: 4.5 G/DL (ref 3.5–5.2)
ALBUMIN/GLOB SERPL: 1.9 G/DL
ALP SERPL-CCNC: 62 U/L (ref 39–117)
ALT SERPL W P-5'-P-CCNC: 12 U/L (ref 1–41)
ANION GAP SERPL CALCULATED.3IONS-SCNC: 10.8 MMOL/L (ref 5–15)
AST SERPL-CCNC: 19 U/L (ref 1–40)
BASOPHILS # BLD AUTO: 0.03 10*3/MM3 (ref 0–0.2)
BASOPHILS NFR BLD AUTO: 0.5 % (ref 0–1.5)
BILIRUB SERPL-MCNC: 0.2 MG/DL (ref 0–1.2)
BUN SERPL-MCNC: 10 MG/DL (ref 6–20)
BUN/CREAT SERPL: 9.7 (ref 7–25)
CALCIUM SPEC-SCNC: 9.6 MG/DL (ref 8.6–10.5)
CHLORIDE SERPL-SCNC: 102 MMOL/L (ref 98–107)
CHOLEST SERPL-MCNC: 190 MG/DL (ref 0–200)
CO2 SERPL-SCNC: 26.2 MMOL/L (ref 22–29)
CREAT SERPL-MCNC: 1.03 MG/DL (ref 0.76–1.27)
DEPRECATED RDW RBC AUTO: 40.5 FL (ref 37–54)
EGFRCR SERPLBLD CKD-EPI 2021: 96.5 ML/MIN/1.73
EOSINOPHIL # BLD AUTO: 0.13 10*3/MM3 (ref 0–0.4)
EOSINOPHIL NFR BLD AUTO: 2.4 % (ref 0.3–6.2)
ERYTHROCYTE [DISTWIDTH] IN BLOOD BY AUTOMATED COUNT: 12.3 % (ref 12.3–15.4)
FERRITIN SERPL-MCNC: 78.4 NG/ML (ref 30–400)
FOLATE SERPL-MCNC: 12.8 NG/ML (ref 4.78–24.2)
GLOBULIN UR ELPH-MCNC: 2.4 GM/DL
GLUCOSE SERPL-MCNC: 85 MG/DL (ref 65–99)
HCT VFR BLD AUTO: 40.3 % (ref 37.5–51)
HDLC SERPL-MCNC: 45 MG/DL (ref 40–60)
HGB BLD-MCNC: 13.5 G/DL (ref 13–17.7)
IMM GRANULOCYTES # BLD AUTO: 0.02 10*3/MM3 (ref 0–0.05)
IMM GRANULOCYTES NFR BLD AUTO: 0.4 % (ref 0–0.5)
IRON 24H UR-MRATE: 75 MCG/DL (ref 59–158)
IRON SATN MFR SERPL: 20 % (ref 20–50)
LDLC SERPL CALC-MCNC: 130 MG/DL (ref 0–100)
LDLC/HDLC SERPL: 2.85 {RATIO}
LYMPHOCYTES # BLD AUTO: 2.47 10*3/MM3 (ref 0.7–3.1)
LYMPHOCYTES NFR BLD AUTO: 45 % (ref 19.6–45.3)
MAGNESIUM SERPL-MCNC: 2.3 MG/DL (ref 1.6–2.6)
MCH RBC QN AUTO: 30.3 PG (ref 26.6–33)
MCHC RBC AUTO-ENTMCNC: 33.5 G/DL (ref 31.5–35.7)
MCV RBC AUTO: 90.6 FL (ref 79–97)
MONOCYTES # BLD AUTO: 0.56 10*3/MM3 (ref 0.1–0.9)
MONOCYTES NFR BLD AUTO: 10.2 % (ref 5–12)
NEUTROPHILS NFR BLD AUTO: 2.28 10*3/MM3 (ref 1.7–7)
NEUTROPHILS NFR BLD AUTO: 41.5 % (ref 42.7–76)
NRBC BLD AUTO-RTO: 0 /100 WBC (ref 0–0.2)
PLATELET # BLD AUTO: 202 10*3/MM3 (ref 140–450)
PMV BLD AUTO: 9.2 FL (ref 6–12)
POTASSIUM SERPL-SCNC: 4.1 MMOL/L (ref 3.5–5.2)
PROT SERPL-MCNC: 6.9 G/DL (ref 6–8.5)
RBC # BLD AUTO: 4.45 10*6/MM3 (ref 4.14–5.8)
SODIUM SERPL-SCNC: 139 MMOL/L (ref 136–145)
TIBC SERPL-MCNC: 377 MCG/DL (ref 298–536)
TRANSFERRIN SERPL-MCNC: 253 MG/DL (ref 200–360)
TRIGL SERPL-MCNC: 83 MG/DL (ref 0–150)
TSH SERPL DL<=0.05 MIU/L-ACNC: 3.21 UIU/ML (ref 0.27–4.2)
VIT B12 BLD-MCNC: 777 PG/ML (ref 211–946)
VLDLC SERPL-MCNC: 15 MG/DL (ref 5–40)
WBC NRBC COR # BLD AUTO: 5.49 10*3/MM3 (ref 3.4–10.8)

## 2024-08-12 PROCEDURE — 83540 ASSAY OF IRON: CPT | Performed by: NURSE PRACTITIONER

## 2024-08-12 PROCEDURE — 82306 VITAMIN D 25 HYDROXY: CPT | Performed by: NURSE PRACTITIONER

## 2024-08-12 PROCEDURE — 82746 ASSAY OF FOLIC ACID SERUM: CPT | Performed by: NURSE PRACTITIONER

## 2024-08-12 PROCEDURE — 80053 COMPREHEN METABOLIC PANEL: CPT | Performed by: NURSE PRACTITIONER

## 2024-08-12 PROCEDURE — 82728 ASSAY OF FERRITIN: CPT | Performed by: NURSE PRACTITIONER

## 2024-08-12 PROCEDURE — 1159F MED LIST DOCD IN RCRD: CPT | Performed by: NURSE PRACTITIONER

## 2024-08-12 PROCEDURE — 82607 VITAMIN B-12: CPT | Performed by: NURSE PRACTITIONER

## 2024-08-12 PROCEDURE — 84443 ASSAY THYROID STIM HORMONE: CPT | Performed by: NURSE PRACTITIONER

## 2024-08-12 PROCEDURE — 85025 COMPLETE CBC W/AUTO DIFF WBC: CPT | Performed by: NURSE PRACTITIONER

## 2024-08-12 PROCEDURE — 1160F RVW MEDS BY RX/DR IN RCRD: CPT | Performed by: NURSE PRACTITIONER

## 2024-08-12 PROCEDURE — 99214 OFFICE O/P EST MOD 30 MIN: CPT | Performed by: NURSE PRACTITIONER

## 2024-08-12 PROCEDURE — 84466 ASSAY OF TRANSFERRIN: CPT | Performed by: NURSE PRACTITIONER

## 2024-08-12 PROCEDURE — 83735 ASSAY OF MAGNESIUM: CPT | Performed by: NURSE PRACTITIONER

## 2024-08-12 PROCEDURE — 80061 LIPID PANEL: CPT | Performed by: NURSE PRACTITIONER

## 2024-08-12 RX ORDER — OLANZAPINE 10 MG/1
10 TABLET ORAL NIGHTLY
Qty: 90 TABLET | Refills: 1 | Status: SHIPPED | OUTPATIENT
Start: 2024-08-12

## 2024-08-12 RX ORDER — BUPROPION HYDROCHLORIDE 300 MG/1
300 TABLET ORAL DAILY
Qty: 90 TABLET | Refills: 1 | Status: SHIPPED | OUTPATIENT
Start: 2024-08-12

## 2024-08-12 NOTE — TELEPHONE ENCOUNTER
Caller: Dominik Montiel A    Relationship to patient: Self    Best call back number: [phone]    Patient is needing: PATIENT CALLED STATING HE IS NEEDING A PA FOR HIS OLANZAPINE 10MG.

## 2024-08-12 NOTE — PROGRESS NOTES
"Chief Complaint  Establish Care (New patient. The patient is needing refills and maybe increasing the dose. Patient would also like lab work. )  Subjective      History of Present Illness  Dominik Montiel is a 38 y.o. male who presents to [facility]:    1.  To establish primary care.    2.   Follow-up refills on medication for mood disorder and ADHD.  The patient needs medication refills.  Last refills given by the ED.  The patient reports he is doing well on these medications without side effects.  The patient was referred to Dr. Watt by SKYE Churchill and does have an appointment October 11, 2024.  The patient has an appointment with [facility] this month with a therapist and a medical provider.      The patient was seen by sleep medicine in 2021 and he reports he has had difficulty with sleep since getting out of college.    Patient Care Team:  Masha Rizo APRN as PCP - General (Nurse Practitioner)    Past Medical History:   Diagnosis Date    ADD (attention deficit disorder)     Anxiety     Depression     Irritable bowel syndrome         Past Surgical History:   Procedure Laterality Date    COLONOSCOPY          No Known Allergies       Current Outpatient Medications:     buPROPion XL (Wellbutrin XL) 300 MG 24 hr tablet, Take 1 tablet by mouth Daily., Disp: 90 tablet, Rfl: 1    OLANZapine (ZyPREXA) 10 MG tablet, Take 1 tablet by mouth Every Night., Disp: 90 tablet, Rfl: 1    Objective   /80 (BP Location: Right arm, Patient Position: Sitting, Cuff Size: Adult)   Pulse 58   Temp 97.9 °F (36.6 °C) (Temporal)   Ht 170.2 cm (67\")   Wt 70 kg (154 lb 4.8 oz)   SpO2 98%   BMI 24.17 kg/m²    Estimated body mass index is 24.17 kg/m² as calculated from the following:    Height as of this encounter: 170.2 cm (67\").    Weight as of this encounter: 70 kg (154 lb 4.8 oz).   Physical Exam  Vitals reviewed.   Constitutional:       General: He is not in acute " distress.  HENT:      Head: Normocephalic and atraumatic.   Pulmonary:      Effort: Pulmonary effort is normal.   Neurological:      General: No focal deficit present.      Mental Status: He is alert.   Psychiatric:         Attention and Perception: Attention normal.         Mood and Affect: Mood normal.         Speech: Speech normal.         Behavior: Behavior normal.         Thought Content: Thought content normal. Thought content does not include homicidal or suicidal ideation.         Cognition and Memory: Cognition normal.        Result Review :  The following data was reviewed by: SKYE José on 08/12/2024:    Data reviewed : Progress Notes by Latrice Torres APRN (05/02/2023 14:30)             Assessment and Plan   Diagnoses and all orders for this visit:    1. Mood disorder (Primary)    2. Attention deficit hyperactivity disorder (ADHD), unspecified ADHD type    3. Vitamin D deficiency  -     Vitamin D,25-Hydroxy    4. Vitamin B12 deficiency  -     Folate  -     Vitamin B12    5. History of anemia  -     Iron Profile  -     Ferritin    6. Annual physical exam  -     TSH Rfx On Abnormal To Free T4  -     Comprehensive Metabolic Panel  -     CBC & Differential  -     Lipid Panel  -     Magnesium    Other orders  -     OLANZapine (ZyPREXA) 10 MG tablet; Take 1 tablet by mouth Every Night.  Dispense: 90 tablet; Refill: 1  -     buPROPion XL (Wellbutrin XL) 300 MG 24 hr tablet; Take 1 tablet by mouth Daily.  Dispense: 90 tablet; Refill: 1    Mood disorder: Improved over the last 5 weeks on Zyprexa 5 mg daily.  Patient is requesting an increase in his dose.  Increase Zyprexa to 10 mg daily.    ADHD: Improved on bupropion  mg daily.  The patient is requesting an increase in his dose.  Increase bupropion XL to 300 mg daily.    BMI is within normal parameters. No other follow-up for BMI required.     Patient was given instructions and counseling regarding his condition. Please see specific  information pulled into the AVS if appropriate.     Follow Up   Return in about 1 week (around 8/19/2024) for Annual physical.    Dictated Utilizing Dragon Dictation.  Please note that portions of this note were completed with a voice recognition program.  Part of this note may be an electronic transcription/translation of spoken language to printed text using the Dragon Dictation System.    SKYE José

## 2024-08-15 ENCOUNTER — PATIENT ROUNDING (BHMG ONLY) (OUTPATIENT)
Dept: INTERNAL MEDICINE | Age: 39
End: 2024-08-15
Payer: COMMERCIAL

## 2024-08-16 NOTE — TELEPHONE ENCOUNTER
Hub staff attempted to follow warm transfer process and was unsuccessful     Caller: Tiana Dominik A    Relationship to patient: Self    Best call back number:     Patient is needing:      THE PATIENT CALLED FOR AN UPDATE REGARDING THE PA FOR THE OLANZAPINE

## 2024-08-19 ENCOUNTER — TELEPHONE (OUTPATIENT)
Dept: INTERNAL MEDICINE | Age: 39
End: 2024-08-19

## 2024-08-23 ENCOUNTER — OFFICE VISIT (OUTPATIENT)
Dept: INTERNAL MEDICINE | Age: 39
End: 2024-08-23
Payer: COMMERCIAL

## 2024-08-23 VITALS
OXYGEN SATURATION: 98 % | HEIGHT: 67 IN | DIASTOLIC BLOOD PRESSURE: 70 MMHG | SYSTOLIC BLOOD PRESSURE: 120 MMHG | BODY MASS INDEX: 23.92 KG/M2 | WEIGHT: 152.4 LBS | HEART RATE: 70 BPM | TEMPERATURE: 98.2 F

## 2024-08-23 DIAGNOSIS — E53.8 VITAMIN B12 DEFICIENCY: Chronic | ICD-10-CM

## 2024-08-23 DIAGNOSIS — Z00.00 ANNUAL PHYSICAL EXAM: Primary | ICD-10-CM

## 2024-08-23 DIAGNOSIS — F90.9 ATTENTION DEFICIT HYPERACTIVITY DISORDER (ADHD), UNSPECIFIED ADHD TYPE: Chronic | ICD-10-CM

## 2024-08-23 DIAGNOSIS — F39 MOOD DISORDER: Chronic | ICD-10-CM

## 2024-08-23 DIAGNOSIS — E55.9 VITAMIN D DEFICIENCY: Chronic | ICD-10-CM

## 2024-08-23 PROBLEM — F31.9 BIPOLAR DISORDER: Status: RESOLVED | Noted: 2021-10-18 | Resolved: 2024-08-23

## 2024-08-23 NOTE — PROGRESS NOTES
"Chief Complaint  Annual Exam (The patient is coming in for an annual physical, labs done. The patient states that he has been noticing bleeding on his pillow from his head, this may have started a few nights ago. Patient has no appetite or desire to drink. Water retention in abdomen. )  Subjective    History of Present Illness  Dominik Montiel is a 38 y.o. male who presents to [facility] for His annual physical exam and follow-up mood disorder and ADHD.     Past Medical History:   Diagnosis Date    ADD (attention deficit disorder)     Anxiety     Depression     Irritable bowel syndrome         Past Surgical History:   Procedure Laterality Date    COLONOSCOPY          Social History     Tobacco Use   Smoking Status Never   Smokeless Tobacco Never        Patient Care Team:  Masha Rizo APRN as PCP - General (Nurse Practitioner)    No Known Allergies       Current Outpatient Medications:     buPROPion XL (Wellbutrin XL) 300 MG 24 hr tablet, Take 1 tablet by mouth Daily., Disp: 90 tablet, Rfl: 1    Objective   Vitals:    08/23/24 1343   BP: 120/70   BP Location: Right arm   Patient Position: Sitting   Cuff Size: Adult   Pulse: 70   Temp: 98.2 °F (36.8 °C)   TempSrc: Temporal   SpO2: 98%   Weight: 69.1 kg (152 lb 6.4 oz)   Height: 170.2 cm (67\")        Wt Readings from Last 3 Encounters:   08/23/24 69.1 kg (152 lb 6.4 oz)   08/12/24 70 kg (154 lb 4.8 oz)   08/01/24 66.2 kg (145 lb 15.1 oz)      BP Readings from Last 3 Encounters:   08/23/24 120/70   08/12/24 121/80   08/01/24 114/73        Physical Exam  Vitals reviewed.   Constitutional:       General: He is not in acute distress.  HENT:      Head: Normocephalic and atraumatic.   Eyes:      Conjunctiva/sclera: Conjunctivae normal.   Cardiovascular:      Rate and Rhythm: Normal rate and regular rhythm.      Heart sounds: Normal heart sounds.   Pulmonary:      Effort: Pulmonary effort is normal.      Breath sounds: Normal breath " sounds. No wheezing, rhonchi or rales.   Abdominal:      General: There is no distension.      Palpations: Abdomen is soft. There is no mass.      Tenderness: There is no abdominal tenderness.   Musculoskeletal:      Right lower leg: No edema.      Left lower leg: No edema.   Lymphadenopathy:      Cervical: No cervical adenopathy.   Skin:     General: Skin is warm and dry.   Neurological:      General: No focal deficit present.      Mental Status: He is alert.   Psychiatric:         Mood and Affect: Mood normal.         Thought Content: Thought content normal.          Result Review   The following data was reviewed by: SKYE José on 08/23/2024:  [x]  Tests & Results  []  Hospitalization/Emergency Department/Urgent Care  []  Internal/External Consultant Notes       Assessment and Plan   Diagnoses and all orders for this visit:    1. Annual physical exam (Primary)  -     CBC & Differential; Future  -     Comprehensive Metabolic Panel; Future  -     Lipid Panel; Future  -     TSH Rfx On Abnormal To Free T4; Future    2. Attention deficit hyperactivity disorder (ADHD), unspecified ADHD type    3. Mood disorder    4. Vitamin D deficiency  -     Vitamin D,25-Hydroxy; Future    5. Vitamin B12 deficiency  -     Vitamin B12; Future       Annual Wellness: Discussed healthy diet, exercise, adequate sleep, cancer screening, immunizations and preventative care.     BMI is within normal parameters. No other follow-up for BMI required.     ADHD: Continues on Wellbutrin xl 300 mg daily.     Mood disorder: Doing well off Zyrexa.     Vitamin D deficiency: Vitamin D level normal on supplement and to continue.    Vitamin B12 deficiency: Level is normal and to continue on supplementation.    Dominik Montiel  reports that he has never smoked. He has never used smokeless tobacco.           Patient was given instructions and counseling regarding his condition or for health maintenance advice. Please see specific information pulled  into the AVS if appropriate.     Follow Up   Return in about 1 year (around 8/23/2025) for Annual physical.    Please note that portions of this documentation were completed with a voice recognition program.    SKYE José

## 2025-04-24 ENCOUNTER — LAB (OUTPATIENT)
Dept: LAB | Facility: HOSPITAL | Age: 40
End: 2025-04-24
Payer: COMMERCIAL

## 2025-04-24 ENCOUNTER — OFFICE VISIT (OUTPATIENT)
Dept: FAMILY MEDICINE CLINIC | Facility: CLINIC | Age: 40
End: 2025-04-24
Payer: COMMERCIAL

## 2025-04-24 VITALS
OXYGEN SATURATION: 100 % | WEIGHT: 142 LBS | BODY MASS INDEX: 22.29 KG/M2 | SYSTOLIC BLOOD PRESSURE: 123 MMHG | HEIGHT: 67 IN | TEMPERATURE: 97.6 F | HEART RATE: 77 BPM | RESPIRATION RATE: 16 BRPM | DIASTOLIC BLOOD PRESSURE: 80 MMHG

## 2025-04-24 DIAGNOSIS — R14.0 ABDOMINAL DISTENTION: ICD-10-CM

## 2025-04-24 DIAGNOSIS — Z13.1 SCREENING FOR DIABETES MELLITUS: ICD-10-CM

## 2025-04-24 DIAGNOSIS — F90.9 ATTENTION DEFICIT HYPERACTIVITY DISORDER (ADHD), UNSPECIFIED ADHD TYPE: ICD-10-CM

## 2025-04-24 DIAGNOSIS — E53.8 VITAMIN B12 DEFICIENCY: Chronic | ICD-10-CM

## 2025-04-24 DIAGNOSIS — F39 MOOD DISORDER: Chronic | ICD-10-CM

## 2025-04-24 DIAGNOSIS — F41.8 DEPRESSION WITH ANXIETY: Chronic | ICD-10-CM

## 2025-04-24 DIAGNOSIS — F41.8 DEPRESSION WITH ANXIETY: Primary | Chronic | ICD-10-CM

## 2025-04-24 DIAGNOSIS — F09 COGNITIVE DYSFUNCTION: ICD-10-CM

## 2025-04-24 DIAGNOSIS — K58.1 IRRITABLE BOWEL SYNDROME WITH CONSTIPATION: Chronic | ICD-10-CM

## 2025-04-24 DIAGNOSIS — F51.01 PRIMARY INSOMNIA: Chronic | ICD-10-CM

## 2025-04-24 DIAGNOSIS — R90.89 ABNORMAL BRAIN MRI: ICD-10-CM

## 2025-04-24 DIAGNOSIS — E78.00 ELEVATED LDL CHOLESTEROL LEVEL: ICD-10-CM

## 2025-04-24 DIAGNOSIS — E55.9 VITAMIN D DEFICIENCY: Chronic | ICD-10-CM

## 2025-04-24 DIAGNOSIS — Z00.00 ANNUAL PHYSICAL EXAM: ICD-10-CM

## 2025-04-24 LAB
25(OH)D3 SERPL-MCNC: 31.7 NG/ML (ref 30–100)
ALBUMIN SERPL-MCNC: 4.8 G/DL (ref 3.5–5.2)
ALBUMIN/GLOB SERPL: 1.9 G/DL
ALP SERPL-CCNC: 53 U/L (ref 39–117)
ALT SERPL W P-5'-P-CCNC: 10 U/L (ref 1–41)
ANION GAP SERPL CALCULATED.3IONS-SCNC: 12 MMOL/L (ref 5–15)
AST SERPL-CCNC: 16 U/L (ref 1–40)
BASOPHILS # BLD AUTO: 0.02 10*3/MM3 (ref 0–0.2)
BASOPHILS NFR BLD AUTO: 0.4 % (ref 0–1.5)
BILIRUB SERPL-MCNC: 0.6 MG/DL (ref 0–1.2)
BUN SERPL-MCNC: 10 MG/DL (ref 6–20)
BUN/CREAT SERPL: 11.1 (ref 7–25)
CALCIUM SPEC-SCNC: 9.4 MG/DL (ref 8.6–10.5)
CHLORIDE SERPL-SCNC: 102 MMOL/L (ref 98–107)
CHOLEST SERPL-MCNC: 175 MG/DL (ref 0–200)
CO2 SERPL-SCNC: 25 MMOL/L (ref 22–29)
CREAT SERPL-MCNC: 0.9 MG/DL (ref 0.76–1.27)
DEPRECATED RDW RBC AUTO: 41.1 FL (ref 37–54)
EGFRCR SERPLBLD CKD-EPI 2021: 112.8 ML/MIN/1.73
EOSINOPHIL # BLD AUTO: 0.08 10*3/MM3 (ref 0–0.4)
EOSINOPHIL NFR BLD AUTO: 1.5 % (ref 0.3–6.2)
ERYTHROCYTE [DISTWIDTH] IN BLOOD BY AUTOMATED COUNT: 12.6 % (ref 12.3–15.4)
FERRITIN SERPL-MCNC: 88.4 NG/ML (ref 30–400)
FOLATE SERPL-MCNC: 16.5 NG/ML (ref 4.78–24.2)
GLOBULIN UR ELPH-MCNC: 2.5 GM/DL
GLUCOSE SERPL-MCNC: 81 MG/DL (ref 65–99)
HBA1C MFR BLD: 5.2 % (ref 4.8–5.6)
HCT VFR BLD AUTO: 40.8 % (ref 37.5–51)
HDLC SERPL-MCNC: 45 MG/DL (ref 40–60)
HGB BLD-MCNC: 13.3 G/DL (ref 13–17.7)
IMM GRANULOCYTES # BLD AUTO: 0.01 10*3/MM3 (ref 0–0.05)
IMM GRANULOCYTES NFR BLD AUTO: 0.2 % (ref 0–0.5)
IRON 24H UR-MRATE: 131 MCG/DL (ref 59–158)
IRON SATN MFR SERPL: 32 % (ref 20–50)
LDLC SERPL CALC-MCNC: 117 MG/DL (ref 0–100)
LDLC/HDLC SERPL: 2.58 {RATIO}
LYMPHOCYTES # BLD AUTO: 2.07 10*3/MM3 (ref 0.7–3.1)
LYMPHOCYTES NFR BLD AUTO: 37.6 % (ref 19.6–45.3)
MCH RBC QN AUTO: 29.3 PG (ref 26.6–33)
MCHC RBC AUTO-ENTMCNC: 32.6 G/DL (ref 31.5–35.7)
MCV RBC AUTO: 89.9 FL (ref 79–97)
MONOCYTES # BLD AUTO: 0.41 10*3/MM3 (ref 0.1–0.9)
MONOCYTES NFR BLD AUTO: 7.5 % (ref 5–12)
NEUTROPHILS NFR BLD AUTO: 2.91 10*3/MM3 (ref 1.7–7)
NEUTROPHILS NFR BLD AUTO: 52.8 % (ref 42.7–76)
NRBC BLD AUTO-RTO: 0 /100 WBC (ref 0–0.2)
PLATELET # BLD AUTO: 209 10*3/MM3 (ref 140–450)
PMV BLD AUTO: 9.2 FL (ref 6–12)
POTASSIUM SERPL-SCNC: 3.4 MMOL/L (ref 3.5–5.2)
PROT SERPL-MCNC: 7.3 G/DL (ref 6–8.5)
RBC # BLD AUTO: 4.54 10*6/MM3 (ref 4.14–5.8)
SODIUM SERPL-SCNC: 139 MMOL/L (ref 136–145)
T4 FREE SERPL-MCNC: 1.12 NG/DL (ref 0.92–1.68)
TIBC SERPL-MCNC: 408 MCG/DL (ref 298–536)
TRANSFERRIN SERPL-MCNC: 274 MG/DL (ref 200–360)
TRIGL SERPL-MCNC: 69 MG/DL (ref 0–150)
TSH SERPL DL<=0.05 MIU/L-ACNC: 1.66 UIU/ML (ref 0.27–4.2)
TSH SERPL DL<=0.05 MIU/L-ACNC: 1.66 UIU/ML (ref 0.27–4.2)
VIT B12 BLD-MCNC: 377 PG/ML (ref 211–946)
VLDLC SERPL-MCNC: 13 MG/DL (ref 5–40)
WBC NRBC COR # BLD AUTO: 5.5 10*3/MM3 (ref 3.4–10.8)

## 2025-04-24 PROCEDURE — 83540 ASSAY OF IRON: CPT

## 2025-04-24 PROCEDURE — 82728 ASSAY OF FERRITIN: CPT

## 2025-04-24 PROCEDURE — 82175 ASSAY OF ARSENIC: CPT

## 2025-04-24 PROCEDURE — 82300 ASSAY OF CADMIUM: CPT

## 2025-04-24 PROCEDURE — 80061 LIPID PANEL: CPT

## 2025-04-24 PROCEDURE — 82746 ASSAY OF FOLIC ACID SERUM: CPT

## 2025-04-24 PROCEDURE — 84439 ASSAY OF FREE THYROXINE: CPT

## 2025-04-24 PROCEDURE — 83825 ASSAY OF MERCURY: CPT

## 2025-04-24 PROCEDURE — 82306 VITAMIN D 25 HYDROXY: CPT

## 2025-04-24 PROCEDURE — 36415 COLL VENOUS BLD VENIPUNCTURE: CPT

## 2025-04-24 PROCEDURE — 84443 ASSAY THYROID STIM HORMONE: CPT

## 2025-04-24 PROCEDURE — 84466 ASSAY OF TRANSFERRIN: CPT

## 2025-04-24 PROCEDURE — 80053 COMPREHEN METABOLIC PANEL: CPT

## 2025-04-24 PROCEDURE — 85025 COMPLETE CBC W/AUTO DIFF WBC: CPT

## 2025-04-24 PROCEDURE — 83655 ASSAY OF LEAD: CPT

## 2025-04-24 PROCEDURE — 82607 VITAMIN B-12: CPT

## 2025-04-24 PROCEDURE — 83036 HEMOGLOBIN GLYCOSYLATED A1C: CPT

## 2025-04-24 NOTE — PROGRESS NOTES
Chief Complaint  Establish Care (Pt would like to be tested for ascites. Pt states that he feels like he is having water retention in his abdomen for several months. )    Moses Montiel presents to [facility]  History of Present Illness    History of Present Illness  The patient is a 39-year-old male who presents to [facility]. He has several complaints and concerns, some related to potential ascites in his abdomen. He is only on Wellbutrin for anxiety and depression. There is no known history of diabetes, hyperlipidemia, or other conditions. He does have a past diagnosis of ADHD.    Labs were last performed on 08/2024 when he went to the ED concerned about needing medication refills for chronic conditions and depression. Ferritin levels were normal, BMP was normal, cholesterol showed an LDL of 130, which is not significantly bad given no high blood pressure or other conditions. B12, folate, and magnesium levels were normal, and CBC was within normal limits.    A CT of the abdomen and pelvis was performed on 05/2023, showing no abnormalities. The liver, spleen, pancreas, and kidneys were all within normal range limits, with no increased size or other issues, and no ascites.    A neurology consultation for an abnormal MRI from 05/2016 at [facility] indicated an abnormal neuropsychological evaluation, accompanied by his mother. He was referred to a neuropsychologist due to lethargy, depression, anxiety, and insomnia. Based on the neuropsychologist's recommendations, he discontinued driving, moved home, and quit his job. There was no tumor on imaging. He has a history of ADHD, diagnosed in [employer], and has been on natural supplements as well as Adderall, Vyvanse, Concerta, and Strattera. Per the neuropsychologist's note, he does not have ADHD. He was started on Wellbutrin by his PCP for anxiety and depression, which has been the only medicine helpful for him,  "improving sleep, anxiety, and depression. He also reports a history of OCD. The note recommended repeat neuropsychological testing, labs, and an MRI in 6 months for cognitive dysfunction.    An MRI from 11/19/2015 showed vague areas of increased signal intensity in the deep white matter in the occipital and parietal regions superiorly, which were nonspecific but reported inflammatory processes. The neuropsychological evaluation severely noted impaired neuropsych functioning, recommending neuro rehab, no driving, and guardianship.    Review of medications prescribed to him in the past from 2013 through 2021 includes Latuda, Seroquel, Abilify, Wellbutrin, Lunesta, Vistaril, Strattera, Zoloft, temazepam, Viibryd, trazodone, olanzapine, and others. He was seen by a specialist for IBS and diagnosed with IBS-C with constipation in the past.    He reports difficulty in articulating his thoughts and has requested that his mother be contacted for further clarification. He has been fasting today due to scheduled blood work but typically experiences hunger after a period of 4 to 5 hours without food. His sleep pattern has been disrupted as he has been residing with a friend for the past month.      Objective   Vital Signs:   /80 (BP Location: Left arm, Patient Position: Sitting, Cuff Size: Adult)   Pulse 77   Temp 97.6 °F (36.4 °C)   Resp 16   Ht 170.2 cm (67\")   Wt 64.4 kg (142 lb)   SpO2 100%   BMI 22.24 kg/m²       Physical Exam  Vitals reviewed.   Constitutional:       Appearance: Normal appearance. He is well-developed.   HENT:      Head: Normocephalic and atraumatic.      Right Ear: External ear normal.      Left Ear: External ear normal.      Nose: Nose normal.   Eyes:      Conjunctiva/sclera: Conjunctivae normal.      Pupils: Pupils are equal, round, and reactive to light.   Cardiovascular:      Rate and Rhythm: Normal rate.   Pulmonary:      Effort: Pulmonary effort is normal.      Breath sounds: Normal " breath sounds.   Abdominal:      General: There is distension.      Comments: Concerns for abd distention and fluid/ascites per patient   Skin:     General: Skin is warm and dry.   Neurological:      Mental Status: He is alert and oriented to person, place, and time. Mental status is at baseline.   Psychiatric:         Mood and Affect: Mood and affect normal.         Behavior: Behavior normal.         Thought Content: Thought content normal.         Judgment: Judgment normal.          Result Review :   The following data was reviewed by: Pola Chan DO on 04/24/2025:  Common labs          8/12/2024    09:41   Common Labs   Glucose 85    BUN 10    Creatinine 1.03    Sodium 139    Potassium 4.1    Chloride 102    Calcium 9.6    Albumin 4.5    Total Bilirubin 0.2    Alkaline Phosphatase 62    AST (SGOT) 19    ALT (SGPT) 12    WBC 5.49    Hemoglobin 13.5    Hematocrit 40.3    Platelets 202    Total Cholesterol 190    Triglycerides 83    HDL Cholesterol 45    LDL Cholesterol  130      Data reviewed : Recent hospitalization notes ED note from 8/2024, presented needing medication refills for depression       Results  Labs   - Ferritin: 08/2024, Normal   - BMP: 08/2024, Normal   - LDL Cholesterol: 08/2024, 130   - B12: 08/2024, Normal   - Folate: 08/2024, Normal   - Magnesium: 08/2024, Normal   - CBC: 08/2024, Within normal limits    Imaging   - CT abdomen and pelvis: 05/2023, No abnormalities. Liver, spleen, pancreas, kidneys were all within normal range limits. No increased size or other. No ascites.   - MRI: 11/19/2015, Vague areas of increased signal intensity in deep white matter in occipital and parietal regions superiorly, nonspecific but reported inflammatory processes.                 Assessment and Plan    Diagnoses and all orders for this visit:    1. Depression with anxiety (Primary)  -     Comprehensive Metabolic Panel; Future  -     CBC (No Diff); Future  -     TSH+Free T4; Future    2. Attention deficit  hyperactivity disorder (ADHD), unspecified ADHD type  -     Iron Profile; Future  -     Ferritin; Future    3. Mood disorder    4. Primary insomnia  -     Vitamin B12; Future  -     Folate; Future  -     Iron Profile; Future  -     Ferritin; Future    5. Abdominal distention  -     Vitamin B12; Future  -     Folate; Future  -     Iron Profile; Future  -     Ferritin; Future    6. Elevated LDL cholesterol level  -     Lipid Panel; Future    7. Screening for diabetes mellitus  -     Hemoglobin A1c; Future    8. Irritable bowel syndrome with constipation  -     Iron Profile; Future  -     Ferritin; Future    9. Cognitive dysfunction  -     Heavy Metals, Blood; Future  -     Heavy Metals Profile II, Blood; Future  -     Heavy Metals Profile I, Urine Random -; Future  -     Heavy Metals Profile II, Urine - Urine, Clean Catch; Future  -     Iron Profile; Future  -     Ferritin; Future    10. Abnormal brain MRI  -     Heavy Metals, Blood; Future  -     Heavy Metals Profile II, Blood; Future  -     Heavy Metals Profile I, Urine Random -; Future  -     Heavy Metals Profile II, Urine - Urine, Clean Catch; Future        Assessment & Plan  1. Cognitive dysfunction.  - Comprehensive discussion held with his mother via phone, with the patient present, regarding past concerns related to heavy metal exposure and potential developmental delays or impairments possibly linked to previous vaccinations.  - Review of past medical history and medications suggests a plausible diagnosis of schizophrenia contributing to current issues.  - Discussed the possibility of administering questionnaires to further evaluate and specify his condition. Noted that he has not previously received Abilify or risperidone injections, which could potentially be more suitable for him in the future.  - Laboratory tests will be conducted today to check for heavy metals and other parameters. Advised to follow up in a few weeks or sooner if necessary.  Consideration of psychiatric consultation or other specialist involvement discussed to provide additional support and potentially consider long-term management and placement in a rest home based on his response to treatment and other factors.          Follow Up   Return in about 3 weeks (around 5/15/2025), or if symptoms worsen or fail to improve, for Next scheduled follow up, Recheck, Labs before.    Patient was given instructions and counseling regarding his condition or for health maintenance advice. Please see specific information pulled into the AVS if appropriate.       Transcribed from ambient dictation for Pola Chan DO by Pola Chan DO.  04/24/25   15:25 EDT    Patient or patient representative verbalized consent for the use of Ambient Listening during the visit with  Pola Chan DO for chart documentation. 4/24/2025  19:57 EDT

## 2025-04-25 ENCOUNTER — LAB (OUTPATIENT)
Dept: LAB | Facility: HOSPITAL | Age: 40
End: 2025-04-25
Payer: COMMERCIAL

## 2025-04-25 PROCEDURE — 83825 ASSAY OF MERCURY: CPT

## 2025-04-25 PROCEDURE — 82175 ASSAY OF ARSENIC: CPT

## 2025-04-25 PROCEDURE — 82570 ASSAY OF URINE CREATININE: CPT

## 2025-04-25 PROCEDURE — 83655 ASSAY OF LEAD: CPT

## 2025-04-25 PROCEDURE — 82300 ASSAY OF CADMIUM: CPT

## 2025-04-29 LAB
ARSENIC BLD-MCNC: 1 UG/L (ref 0–9)
CADMIUM BLD-MCNC: <0.5 UG/L (ref 0–1.2)
LEAD BLDV-MCNC: <1 UG/DL (ref 0–3.4)
MERCURY BLD-MCNC: <1 UG/L (ref 0–14.9)

## 2025-05-01 LAB
ARSENIC UR-MCNC: NORMAL UG/L (ref 0–9)
CADMIUM 24H UR-MCNC: NORMAL UG/L
CREAT UR-MCNC: 1.04 G/L (ref 0.3–3)
LEAD 24H UR-MCNC: NORMAL UG/L (ref 0–49)
MERCURY 24H UR-MCNC: NORMAL UG/L (ref 0–19)

## 2025-06-17 ENCOUNTER — OFFICE VISIT (OUTPATIENT)
Dept: FAMILY MEDICINE CLINIC | Facility: CLINIC | Age: 40
End: 2025-06-17
Payer: COMMERCIAL

## 2025-06-17 VITALS
TEMPERATURE: 97.8 F | SYSTOLIC BLOOD PRESSURE: 118 MMHG | RESPIRATION RATE: 16 BRPM | WEIGHT: 139 LBS | HEIGHT: 67 IN | HEART RATE: 76 BPM | OXYGEN SATURATION: 100 % | BODY MASS INDEX: 21.82 KG/M2 | DIASTOLIC BLOOD PRESSURE: 79 MMHG

## 2025-06-17 DIAGNOSIS — K62.5 BRIGHT RED BLOOD PER RECTUM: ICD-10-CM

## 2025-06-17 DIAGNOSIS — F41.8 DEPRESSION WITH ANXIETY: ICD-10-CM

## 2025-06-17 DIAGNOSIS — Z12.11 COLON CANCER SCREENING: ICD-10-CM

## 2025-06-17 DIAGNOSIS — F99 MENTAL HEALTH DISORDER: ICD-10-CM

## 2025-06-17 DIAGNOSIS — R45.1 AGITATION: Primary | ICD-10-CM

## 2025-06-17 DIAGNOSIS — F23 BRIEF PSYCHOTIC DISORDER: ICD-10-CM

## 2025-06-17 DIAGNOSIS — F39 MOOD DISORDER: ICD-10-CM

## 2025-06-17 DIAGNOSIS — F90.0 ATTENTION DEFICIT HYPERACTIVITY DISORDER (ADHD), PREDOMINANTLY INATTENTIVE TYPE: ICD-10-CM

## 2025-06-17 DIAGNOSIS — R19.00 ABDOMINAL SWELLING: ICD-10-CM

## 2025-06-17 DIAGNOSIS — F09 COGNITIVE DYSFUNCTION: ICD-10-CM

## 2025-06-17 PROCEDURE — 1160F RVW MEDS BY RX/DR IN RCRD: CPT | Performed by: FAMILY MEDICINE

## 2025-06-17 PROCEDURE — 1159F MED LIST DOCD IN RCRD: CPT | Performed by: FAMILY MEDICINE

## 2025-06-17 PROCEDURE — 1126F AMNT PAIN NOTED NONE PRSNT: CPT | Performed by: FAMILY MEDICINE

## 2025-06-17 PROCEDURE — 99214 OFFICE O/P EST MOD 30 MIN: CPT | Performed by: FAMILY MEDICINE

## 2025-06-17 NOTE — PROGRESS NOTES
Chief Complaint  ADHD (Pt would like to be screened for ADHD and autism. ), Insomnia (Pt reports that he is unable to get any rest. States that he has a hard time falling asleep and staying asleep. ), and Bloated (Pt states that he has water retention in his stomach- no pain in the abdominal area. Believes that he might have stomach cancer or liver issues. )    Subjective          Dominik Montiel presents to [facility]  Insomnia      History of Present Illness  The patient presents for evaluation of sleep issues, fatigue, ADHD, autism, and hematochezia.    He reports experiencing a sensation of heat in his head, which he attributes to high pressure. He also mentions a persistent feeling of drunkenness, despite not being chronically sleep-deprived. He has been taking a multivitamin and liquid B12 daily since January 2025, which he believes has slightly improved his condition. He is concerned about potential brain tumors or other abnormalities and is interested in undergoing a brain scan.    He describes a long-standing issue of water retention in his abdomen, which he suspects may be related to stomach or colorectal cancer. He also reports foamy urine, a symptom that has persisted for several years. He has noticed a red spot on his head and another on his heart, which he believes are indicative of blood flow loss. He has observed similar spots on his foot and occasional bruising on his legs. He suspects inflammation as the cause of these symptoms.    He expresses concern about a neurotransmitter chemical imbalance, which he believes is preventing him from living a normal life. He reports no improvement since his last visit and describes his current living situation with his parents as toxic. He feels disconnected from his family and experiences significant stress and anxiety, particularly when his parents are present. He has attempted to keep a journal but struggles with motivation due  "to his ADD. He is interested in undergoing further testing for ADHD and autism, as he believes these conditions may be contributing to his symptoms.    He recalls an episode in January 2025 where he noticed blood in his stool, which he describes as bright red and massive. He also reports frequent constipation and straining during bowel movements, which he believes may have led to an anal fissure. He is considering a colonoscopy to investigate these symptoms further. He has a history of hiatal hernia, diagnosed when he was 17 or 18 years old, which he believes is aggravated by certain foods.    He reports a lack of energy and constant fatigue, which he believes is affecting his ability to work and socialize. He also mentions a loss of appetite and thirst and expresses feelings of hopelessness. He has been living with his parents since May 2025 but finds the environment stressful and anxiety-inducing. He has attempted to leave home several times but has always returned.      Objective   Vital Signs:   /79 (BP Location: Left arm, Patient Position: Sitting, Cuff Size: Adult)   Pulse 76   Temp 97.8 °F (36.6 °C)   Resp 16   Ht 170.2 cm (67\")   Wt 63 kg (139 lb)   SpO2 100%   BMI 21.77 kg/m²       Physical Exam  Vitals reviewed.   Constitutional:       Appearance: Normal appearance. He is well-developed.   HENT:      Head: Normocephalic and atraumatic.      Right Ear: External ear normal.      Left Ear: External ear normal.      Nose: Nose normal.   Eyes:      Conjunctiva/sclera: Conjunctivae normal.      Pupils: Pupils are equal, round, and reactive to light.   Cardiovascular:      Rate and Rhythm: Normal rate.   Pulmonary:      Effort: Pulmonary effort is normal.      Breath sounds: Normal breath sounds.   Abdominal:      General: There is distension.      Comments: Concerns for abd distention and fluid/ascites per patient   Skin:     General: Skin is warm and dry.   Neurological:      Mental Status: He is " alert and oriented to person, place, and time. Mental status is at baseline.   Psychiatric:         Mood and Affect: Mood and affect normal.         Behavior: Behavior normal.         Thought Content: Thought content normal.         Judgment: Judgment normal.          Result Review :   The following data was reviewed by: Pola Chan DO on 06/17/2025:  Common labs          8/12/2024    09:41 4/24/2025    16:16   Common Labs   Glucose 85  81    BUN 10  10    Creatinine 1.03  0.90    Sodium 139  139    Potassium 4.1  3.4    Chloride 102  102    Calcium 9.6  9.4    Albumin 4.5  4.8    Total Bilirubin 0.2  0.6    Alkaline Phosphatase 62  53    AST (SGOT) 19  16    ALT (SGPT) 12  10    WBC 5.49  5.50    Hemoglobin 13.5  13.3    Hematocrit 40.3  40.8    Platelets 202  209    Total Cholesterol 190  175    Triglycerides 83  69    HDL Cholesterol 45  45    LDL Cholesterol  130  117    Hemoglobin A1C  5.20           Results  Imaging   - CT scan: 2020, Small renal cyst                 Assessment and Plan    Diagnoses and all orders for this visit:    1. Agitation (Primary)    2. Attention deficit hyperactivity disorder (ADHD), predominantly inattentive type    3. Depression with anxiety    4. Cognitive dysfunction    5. Mood disorder        Assessment & Plan  1. Sleep disturbances.  - Reports feeling chronically sleep-deprived, experiencing pupil dilation, hot sensations in the head, and fatigue.  - Increased pain and limited mobility.  - Referral to psychology for further evaluations and testing.  - CT scan will be ordered to assess any changes since the last scan in 2020.    2. Abdominal fluid retention.  - Reports persistent water retention in the gut, fears it might be related to cancer or liver issues.  - Increased pain and limited mobility.  - Labs will be ordered to check liver function.  - Abdominal scan will be conducted to investigate the cause of the fluid retention.    3. Hematochezia.  - Reports bright red blood  in stool, ongoing for months.  - Increased pain and limited mobility.  - Colonoscopy recommended to rule out serious conditions such as colorectal cancer.  - Labs will be ordered to check for gastrointestinal issues.    4. Attention deficit hyperactivity disorder (ADHD).  - Expresses concerns about ADHD and requests thorough testing.  - Increased pain and limited mobility.  - Referral to psychology for further evaluations and testing.  - Labs will be ordered to check for neurochemical imbalances.    5. Autism.  - Expresses concerns about autism and requests thorough testing.  - Increased pain and limited mobility.  - Referral to psychology for further evaluations and testing.  - Labs will be ordered to check for neurochemical imbalances.          Follow Up   Return in about 4 weeks (around 7/15/2025), or if symptoms worsen or fail to improve, for Next scheduled follow up, Labs before, Recheck.    Patient was given instructions and counseling regarding his condition or for health maintenance advice. Please see specific information pulled into the AVS if appropriate.       Transcribed from ambient dictation for Pola Chan DO by Pola Chan DO.  06/17/25   16:48 EDT    Patient or patient representative verbalized consent for the use of Ambient Listening during the visit with  Pola Chan DO for chart documentation. 6/17/2025  16:48 EDT

## 2025-06-21 ENCOUNTER — LAB (OUTPATIENT)
Facility: HOSPITAL | Age: 40
End: 2025-06-21
Payer: COMMERCIAL

## 2025-06-21 DIAGNOSIS — R45.1 AGITATION: ICD-10-CM

## 2025-06-21 DIAGNOSIS — F41.8 DEPRESSION WITH ANXIETY: ICD-10-CM

## 2025-06-21 DIAGNOSIS — F09 COGNITIVE DYSFUNCTION: ICD-10-CM

## 2025-06-21 DIAGNOSIS — F39 MOOD DISORDER: ICD-10-CM

## 2025-06-21 DIAGNOSIS — F23 BRIEF PSYCHOTIC DISORDER: ICD-10-CM

## 2025-06-21 LAB
CORTIS SERPL-MCNC: 14.7 MCG/DL
CRP SERPL-MCNC: <0.3 MG/DL (ref 0–0.5)
ERYTHROCYTE [SEDIMENTATION RATE] IN BLOOD: 1 MM/HR (ref 0–15)
FSH SERPL-ACNC: 6.77 MIU/ML
LH SERPL-ACNC: 8.19 MIU/ML
PROLACTIN SERPL-MCNC: 10.2 NG/ML (ref 4.04–15.2)

## 2025-06-21 PROCEDURE — 36415 COLL VENOUS BLD VENIPUNCTURE: CPT

## 2025-06-21 PROCEDURE — 84402 ASSAY OF FREE TESTOSTERONE: CPT

## 2025-06-21 PROCEDURE — 82533 TOTAL CORTISOL: CPT | Performed by: FAMILY MEDICINE

## 2025-06-21 PROCEDURE — 86140 C-REACTIVE PROTEIN: CPT

## 2025-06-21 PROCEDURE — 83001 ASSAY OF GONADOTROPIN (FSH): CPT | Performed by: FAMILY MEDICINE

## 2025-06-21 PROCEDURE — 84403 ASSAY OF TOTAL TESTOSTERONE: CPT

## 2025-06-21 PROCEDURE — 83003 ASSAY GROWTH HORMONE (HGH): CPT

## 2025-06-21 PROCEDURE — 82024 ASSAY OF ACTH: CPT | Performed by: FAMILY MEDICINE

## 2025-06-21 PROCEDURE — 85652 RBC SED RATE AUTOMATED: CPT

## 2025-06-21 PROCEDURE — 83727 ASSAY OF LRH HORMONE: CPT

## 2025-06-21 PROCEDURE — 83520 IMMUNOASSAY QUANT NOS NONAB: CPT

## 2025-06-21 PROCEDURE — 84146 ASSAY OF PROLACTIN: CPT | Performed by: FAMILY MEDICINE

## 2025-06-21 PROCEDURE — 83002 ASSAY OF GONADOTROPIN (LH): CPT | Performed by: FAMILY MEDICINE

## 2025-06-24 ENCOUNTER — TELEPHONE (OUTPATIENT)
Dept: GASTROENTEROLOGY | Facility: CLINIC | Age: 40
End: 2025-06-24
Payer: COMMERCIAL

## 2025-06-24 ENCOUNTER — RESULTS FOLLOW-UP (OUTPATIENT)
Dept: FAMILY MEDICINE CLINIC | Facility: CLINIC | Age: 40
End: 2025-06-24

## 2025-06-24 LAB
ACTH PLAS-MCNC: 34.6 PG/ML (ref 7.2–63.3)
TSH RECEP AB SER-ACNC: <1.1 IU/L (ref 0–1.75)

## 2025-06-25 LAB
GH SERPL-MCNC: 0.2 NG/ML (ref 0–10)
TESTOST FREE SERPL-MCNC: 10.6 PG/ML (ref 8.7–25.1)
TESTOST SERPL-MCNC: 610 NG/DL (ref 264–916)

## 2025-06-30 DIAGNOSIS — F41.8 DEPRESSION WITH ANXIETY: ICD-10-CM

## 2025-06-30 DIAGNOSIS — F09 COGNITIVE DYSFUNCTION: ICD-10-CM

## 2025-06-30 DIAGNOSIS — R19.00 ABDOMINAL SWELLING: ICD-10-CM

## 2025-06-30 DIAGNOSIS — F39 MOOD DISORDER: ICD-10-CM

## 2025-06-30 DIAGNOSIS — F23 BRIEF PSYCHOTIC DISORDER: ICD-10-CM

## 2025-06-30 DIAGNOSIS — R45.1 AGITATION: ICD-10-CM

## 2025-07-01 LAB — GNRH SERPL-MCNC: <10 PG/ML

## 2025-07-23 ENCOUNTER — HOSPITAL ENCOUNTER (OUTPATIENT)
Dept: CT IMAGING | Facility: HOSPITAL | Age: 40
Discharge: HOME | End: 2025-07-23
Admitting: FAMILY MEDICINE
Payer: COMMERCIAL

## 2025-07-23 PROCEDURE — 74176 CT ABD & PELVIS W/O CONTRAST: CPT

## 2025-08-12 ENCOUNTER — OFFICE VISIT (OUTPATIENT)
Dept: FAMILY MEDICINE CLINIC | Facility: CLINIC | Age: 40
End: 2025-08-12
Payer: COMMERCIAL

## 2025-08-12 VITALS
HEART RATE: 77 BPM | TEMPERATURE: 98.4 F | HEIGHT: 67 IN | SYSTOLIC BLOOD PRESSURE: 114 MMHG | OXYGEN SATURATION: 100 % | RESPIRATION RATE: 16 BRPM | WEIGHT: 140 LBS | DIASTOLIC BLOOD PRESSURE: 73 MMHG | BODY MASS INDEX: 21.97 KG/M2

## 2025-08-12 DIAGNOSIS — F51.01 PRIMARY INSOMNIA: Chronic | ICD-10-CM

## 2025-08-12 DIAGNOSIS — R41.3 MEMORY LOSS: ICD-10-CM

## 2025-08-12 DIAGNOSIS — F09 COGNITIVE DYSFUNCTION: ICD-10-CM

## 2025-08-12 DIAGNOSIS — F41.8 DEPRESSION WITH ANXIETY: Chronic | ICD-10-CM

## 2025-08-12 DIAGNOSIS — F90.0 ATTENTION DEFICIT HYPERACTIVITY DISORDER (ADHD), PREDOMINANTLY INATTENTIVE TYPE: Chronic | ICD-10-CM

## 2025-08-12 DIAGNOSIS — G43.E09 CHRONIC MIGRAINE WITH AURA WITHOUT STATUS MIGRAINOSUS, NOT INTRACTABLE: ICD-10-CM

## 2025-08-12 DIAGNOSIS — R19.00 ABDOMINAL SWELLING: ICD-10-CM

## 2025-08-12 DIAGNOSIS — F99 MENTAL HEALTH DISORDER: Primary | ICD-10-CM

## 2025-08-12 DIAGNOSIS — F45.21 ILLNESS ANXIETY DISORDER: ICD-10-CM

## 2025-08-12 DIAGNOSIS — K58.1 IRRITABLE BOWEL SYNDROME WITH CONSTIPATION: Chronic | ICD-10-CM

## 2025-08-12 DIAGNOSIS — F23 BRIEF PSYCHOTIC DISORDER: ICD-10-CM

## 2025-08-12 DIAGNOSIS — R90.89 ABNORMAL BRAIN MRI: ICD-10-CM

## 2025-08-12 DIAGNOSIS — R45.1 AGITATION: Chronic | ICD-10-CM

## 2025-08-14 ENCOUNTER — TELEPHONE (OUTPATIENT)
Dept: GASTROENTEROLOGY | Facility: CLINIC | Age: 40
End: 2025-08-14
Payer: COMMERCIAL

## 2025-08-29 ENCOUNTER — HOSPITAL ENCOUNTER (OUTPATIENT)
Dept: MRI IMAGING | Facility: HOSPITAL | Age: 40
Discharge: HOME | End: 2025-08-29
Payer: COMMERCIAL

## 2025-08-29 DIAGNOSIS — R41.3 MEMORY LOSS: ICD-10-CM

## 2025-08-29 DIAGNOSIS — F09 COGNITIVE DYSFUNCTION: ICD-10-CM

## 2025-08-29 DIAGNOSIS — R90.89 ABNORMAL BRAIN MRI: ICD-10-CM

## 2025-08-29 DIAGNOSIS — F51.01 PRIMARY INSOMNIA: Chronic | ICD-10-CM

## 2025-08-29 DIAGNOSIS — R45.1 AGITATION: Chronic | ICD-10-CM

## 2025-08-29 PROCEDURE — A9573 GADOPICLENOL 0.5 MMOL/ML SOLUTION: HCPCS | Performed by: FAMILY MEDICINE

## 2025-08-29 PROCEDURE — 70553 MRI BRAIN STEM W/O & W/DYE: CPT

## 2025-08-29 PROCEDURE — 25510000001 GADOPICLENOL 0.5 MMOL/ML SOLUTION: Performed by: FAMILY MEDICINE

## 2025-08-29 RX ADMIN — GADOPICLENOL 5.5 ML: 485.1 INJECTION INTRAVENOUS at 11:49
